# Patient Record
Sex: FEMALE | Race: BLACK OR AFRICAN AMERICAN | HISPANIC OR LATINO
[De-identification: names, ages, dates, MRNs, and addresses within clinical notes are randomized per-mention and may not be internally consistent; named-entity substitution may affect disease eponyms.]

---

## 2017-03-18 ENCOUNTER — TRANSCRIPTION ENCOUNTER (OUTPATIENT)
Age: 32
End: 2017-03-18

## 2017-05-22 ENCOUNTER — EMERGENCY (EMERGENCY)
Facility: HOSPITAL | Age: 32
LOS: 1 days | Discharge: HOME | End: 2017-05-22
Attending: EMERGENCY MEDICINE
Payer: MEDICAID

## 2017-05-22 VITALS
RESPIRATION RATE: 17 BRPM | TEMPERATURE: 99 F | SYSTOLIC BLOOD PRESSURE: 127 MMHG | OXYGEN SATURATION: 99 % | DIASTOLIC BLOOD PRESSURE: 74 MMHG | HEIGHT: 62 IN | WEIGHT: 149.91 LBS | HEART RATE: 61 BPM

## 2017-05-22 VITALS
DIASTOLIC BLOOD PRESSURE: 56 MMHG | HEART RATE: 67 BPM | RESPIRATION RATE: 17 BRPM | TEMPERATURE: 99 F | OXYGEN SATURATION: 100 % | SYSTOLIC BLOOD PRESSURE: 100 MMHG

## 2017-05-22 DIAGNOSIS — N20.0 CALCULUS OF KIDNEY: ICD-10-CM

## 2017-05-22 DIAGNOSIS — R10.9 UNSPECIFIED ABDOMINAL PAIN: ICD-10-CM

## 2017-05-22 DIAGNOSIS — Z98.82 BREAST IMPLANT STATUS: Chronic | ICD-10-CM

## 2017-05-22 LAB
ALBUMIN SERPL ELPH-MCNC: 3.7 G/DL — SIGNIFICANT CHANGE UP (ref 3.3–5)
ALP SERPL-CCNC: 64 U/L — SIGNIFICANT CHANGE UP (ref 40–120)
ALT FLD-CCNC: 19 U/L — SIGNIFICANT CHANGE UP (ref 12–78)
ANION GAP SERPL CALC-SCNC: 7 MMOL/L — SIGNIFICANT CHANGE UP (ref 5–17)
APPEARANCE UR: CLEAR — SIGNIFICANT CHANGE UP
AST SERPL-CCNC: 14 U/L — LOW (ref 15–37)
BACTERIA # UR AUTO: ABNORMAL
BASOPHILS # BLD AUTO: 0.1 K/UL — SIGNIFICANT CHANGE UP (ref 0–0.2)
BASOPHILS NFR BLD AUTO: 0.4 % — SIGNIFICANT CHANGE UP (ref 0–2)
BILIRUB SERPL-MCNC: 0.3 MG/DL — SIGNIFICANT CHANGE UP (ref 0.2–1.2)
BILIRUB UR-MCNC: NEGATIVE — SIGNIFICANT CHANGE UP
BUN SERPL-MCNC: 9 MG/DL — SIGNIFICANT CHANGE UP (ref 7–23)
CALCIUM SERPL-MCNC: 8.3 MG/DL — LOW (ref 8.5–10.1)
CHLORIDE SERPL-SCNC: 108 MMOL/L — SIGNIFICANT CHANGE UP (ref 96–108)
CO2 SERPL-SCNC: 29 MMOL/L — SIGNIFICANT CHANGE UP (ref 22–31)
COLOR SPEC: YELLOW — SIGNIFICANT CHANGE UP
CREAT SERPL-MCNC: 0.54 MG/DL — SIGNIFICANT CHANGE UP (ref 0.5–1.3)
DIFF PNL FLD: ABNORMAL
EOSINOPHIL # BLD AUTO: 0.1 K/UL — SIGNIFICANT CHANGE UP (ref 0–0.5)
EOSINOPHIL NFR BLD AUTO: 0.9 % — SIGNIFICANT CHANGE UP (ref 0–6)
EPI CELLS # UR: SIGNIFICANT CHANGE UP
GLUCOSE SERPL-MCNC: 91 MG/DL — SIGNIFICANT CHANGE UP (ref 70–99)
GLUCOSE UR QL: NEGATIVE MG/DL — SIGNIFICANT CHANGE UP
HCG SERPL-ACNC: <1 MIU/ML — SIGNIFICANT CHANGE UP
HCT VFR BLD CALC: 38.5 % — SIGNIFICANT CHANGE UP (ref 34.5–45)
HGB BLD-MCNC: 13.7 G/DL — SIGNIFICANT CHANGE UP (ref 11.5–15.5)
HYALINE CASTS # UR AUTO: ABNORMAL /LPF
KETONES UR-MCNC: NEGATIVE — SIGNIFICANT CHANGE UP
LEUKOCYTE ESTERASE UR-ACNC: ABNORMAL
LIDOCAIN IGE QN: 132 U/L — SIGNIFICANT CHANGE UP (ref 73–393)
LYMPHOCYTES # BLD AUTO: 17 % — SIGNIFICANT CHANGE UP (ref 13–44)
LYMPHOCYTES # BLD AUTO: 2.5 K/UL — SIGNIFICANT CHANGE UP (ref 1–3.3)
MCHC RBC-ENTMCNC: 32.1 PG — SIGNIFICANT CHANGE UP (ref 27–34)
MCHC RBC-ENTMCNC: 35.6 GM/DL — SIGNIFICANT CHANGE UP (ref 32–36)
MCV RBC AUTO: 90.3 FL — SIGNIFICANT CHANGE UP (ref 80–100)
MONOCYTES # BLD AUTO: 1.1 K/UL — HIGH (ref 0–0.9)
MONOCYTES NFR BLD AUTO: 7.2 % — SIGNIFICANT CHANGE UP (ref 2–14)
NEUTROPHILS # BLD AUTO: 11.1 K/UL — HIGH (ref 1.8–7.4)
NEUTROPHILS NFR BLD AUTO: 74.5 % — SIGNIFICANT CHANGE UP (ref 43–77)
NITRITE UR-MCNC: NEGATIVE — SIGNIFICANT CHANGE UP
PH UR: 7 — SIGNIFICANT CHANGE UP (ref 5–8)
PLATELET # BLD AUTO: 283 K/UL — SIGNIFICANT CHANGE UP (ref 150–400)
POTASSIUM SERPL-MCNC: 3.6 MMOL/L — SIGNIFICANT CHANGE UP (ref 3.5–5.3)
POTASSIUM SERPL-SCNC: 3.6 MMOL/L — SIGNIFICANT CHANGE UP (ref 3.5–5.3)
PROT SERPL-MCNC: 7.5 GM/DL — SIGNIFICANT CHANGE UP (ref 6–8.3)
PROT UR-MCNC: 15 MG/DL
RBC # BLD: 4.26 M/UL — SIGNIFICANT CHANGE UP (ref 3.8–5.2)
RBC # FLD: 12 % — SIGNIFICANT CHANGE UP (ref 11–15)
RBC CASTS # UR COMP ASSIST: ABNORMAL /HPF (ref 0–4)
SODIUM SERPL-SCNC: 144 MMOL/L — SIGNIFICANT CHANGE UP (ref 135–145)
SP GR SPEC: 1.01 — SIGNIFICANT CHANGE UP (ref 1.01–1.02)
UROBILINOGEN FLD QL: NEGATIVE MG/DL — SIGNIFICANT CHANGE UP
WBC # BLD: 15 K/UL — HIGH (ref 3.8–10.5)
WBC # FLD AUTO: 15 K/UL — HIGH (ref 3.8–10.5)
WBC UR QL: ABNORMAL

## 2017-05-22 PROCEDURE — 99285 EMERGENCY DEPT VISIT HI MDM: CPT | Mod: 25

## 2017-05-22 PROCEDURE — 74176 CT ABD & PELVIS W/O CONTRAST: CPT | Mod: 26

## 2017-05-22 RX ORDER — TAMSULOSIN HYDROCHLORIDE 0.4 MG/1
1 CAPSULE ORAL
Qty: 20 | Refills: 0
Start: 2017-05-22

## 2017-05-22 RX ORDER — KETOROLAC TROMETHAMINE 30 MG/ML
30 SYRINGE (ML) INJECTION ONCE
Qty: 0 | Refills: 0 | Status: DISCONTINUED | OUTPATIENT
Start: 2017-05-22 | End: 2017-05-22

## 2017-05-22 RX ORDER — ONDANSETRON 8 MG/1
4 TABLET, FILM COATED ORAL ONCE
Qty: 0 | Refills: 0 | Status: COMPLETED | OUTPATIENT
Start: 2017-05-22 | End: 2017-05-22

## 2017-05-22 RX ORDER — SODIUM CHLORIDE 9 MG/ML
1000 INJECTION INTRAMUSCULAR; INTRAVENOUS; SUBCUTANEOUS ONCE
Qty: 0 | Refills: 0 | Status: COMPLETED | OUTPATIENT
Start: 2017-05-22 | End: 2017-05-22

## 2017-05-22 RX ORDER — MORPHINE SULFATE 50 MG/1
2 CAPSULE, EXTENDED RELEASE ORAL ONCE
Qty: 0 | Refills: 0 | Status: DISCONTINUED | OUTPATIENT
Start: 2017-05-22 | End: 2017-05-22

## 2017-05-22 RX ORDER — CEPHALEXIN 500 MG
1 CAPSULE ORAL
Qty: 40 | Refills: 0
Start: 2017-05-22 | End: 2017-06-01

## 2017-05-22 RX ADMIN — SODIUM CHLORIDE 1000 MILLILITER(S): 9 INJECTION INTRAMUSCULAR; INTRAVENOUS; SUBCUTANEOUS at 05:48

## 2017-05-22 RX ADMIN — MORPHINE SULFATE 2 MILLIGRAM(S): 50 CAPSULE, EXTENDED RELEASE ORAL at 07:18

## 2017-05-22 RX ADMIN — Medication 30 MILLIGRAM(S): at 05:56

## 2017-05-22 RX ADMIN — ONDANSETRON 4 MILLIGRAM(S): 8 TABLET, FILM COATED ORAL at 05:54

## 2017-05-22 RX ADMIN — MORPHINE SULFATE 2 MILLIGRAM(S): 50 CAPSULE, EXTENDED RELEASE ORAL at 05:47

## 2017-05-22 RX ADMIN — Medication 1 TABLET(S): at 07:40

## 2017-05-22 RX ADMIN — Medication 30 MILLIGRAM(S): at 07:18

## 2017-05-22 NOTE — ED PROVIDER NOTE - OBJECTIVE STATEMENT
Pertinent PMH/PSH/FHx/SHx and Review of Systems contained within:  Patient denying any pmh, PSH of liposuction and breast augmentation presents to the ED for left flank pain radiating to groin and 1 episode of vomiting.  Reports increased urinary frequency for the last few days.  Woke up from pain suddenly at 1 am this evening, says that "it felt someone kicked me hard in the back" and felt a ball of pain in the left groin.  Felt like she had to urinate but ended up having a BM instead.  Denies any dysuria or hematuria.  Denies constipation or diarrhea.  No abnormal vaginal discharge or bleeding.      No fever/chills, No headache/photophobia/eye pain/changes in vision, No ear pain/sore throat/dysphagia, No chest pain/palpitations, no SOB/cough/wheeze/stridor, no discharge, No neck pain, no rash, no changes in neurological status/function. Pertinent PMH/PSH/FHx/SHx and Review of Systems contained within:  Patient denying any pmh other than UTI, PSH of liposuction and breast augmentation presents to the ED for left flank pain radiating to groin and 1 episode of vomiting.  Reports increased urinary frequency for the last few days.  Woke up from pain suddenly at 1 am this evening, says that "it felt someone kicked me hard in the back" and felt a ball of pain in the left groin.  Felt like she had to urinate but ended up having a BM instead.  Denies any dysuria or hematuria.  Denies constipation or diarrhea.  No abnormal vaginal discharge or bleeding.  LMP was 2-3 months ago, has IUD in place.     No fever/chills, No headache/photophobia/eye pain/changes in vision, No ear pain/sore throat/dysphagia, No chest pain/palpitations, no SOB/cough/wheeze/stridor, no discharge, No neck pain, no rash, no changes in neurological status/function.

## 2017-05-22 NOTE — ED ADULT TRIAGE NOTE - CHIEF COMPLAINT QUOTE
biba:   c/o abdominal pain, states it has been throughout the day, left lower quad which radiates to back.  sensation to urinate but has not.  vomit x1.

## 2017-05-22 NOTE — ED PROVIDER NOTE - MEDICAL DECISION MAKING DETAILS
Patient with left flank pain radiating to groin.  VSS.  Labs with mild leukocytosis.  Pending UA and work up of renal stone.  If CT neg would still treat for pyelo.  First dose bactrim given.  Patient signed out to incoming physician.  All decisions regarding the progression of care will be made at their discretion.  Patient stable on sign out with well controlled pain. Patient with left flank pain radiating to groin.  VSS.  Labs with mild leukocytosis.  Pending UA and work up of renal stone.  If CT neg would still treat for pyelo.  First dose bactrim given.  Patient signed out to incoming physician.  All decisions regarding the progression of care will be made at their discretion.  Patient stable on sign out with well controlled pain.  genie vazquez. pt was a signout pending ct. ct shows renal stone 4mm. pt pain controlled. given abx. pt appears well and non-toxic. . VSS. Sx have improved during ED stay. No acute events during ED observation period. Precautions given to return to the ED if sx persist or change. Pt expresses understanding and has no further questions. Pt feels comfortable and wishes to be discharged home. Instructed to follow up with PMD in 24 hrs. urology

## 2017-05-22 NOTE — ED ADULT NURSE NOTE - OBJECTIVE STATEMENT
Patient received lying on stretcher writhing in pain, she presents to ED w/ c/o colicky abd pain a/w n/v and L flank pain since yesterday. Reports one episode of consumed food, reports urinary urgency, denies hematuria, frequency or vaginal d/c. LMP - unable to recall, reports IUD insertion 3/2016. Patient noted guarding abd, abd soft, non-distended, +LLQ and suprapubic tenderness, no active vomiting, appears uncomfortable, +CVAT. 20G angiocath inserted to R arm, labs drawn and sent, urine collected, started on 1L MD ELVIA evaluation pending, will continue to monitor for safety.

## 2017-05-23 LAB
CULTURE RESULTS: NO GROWTH — SIGNIFICANT CHANGE UP
SPECIMEN SOURCE: SIGNIFICANT CHANGE UP

## 2017-06-21 ENCOUNTER — FORM ENCOUNTER (OUTPATIENT)
Age: 32
End: 2017-06-21

## 2017-06-22 ENCOUNTER — OUTPATIENT (OUTPATIENT)
Dept: OUTPATIENT SERVICES | Facility: HOSPITAL | Age: 32
LOS: 1 days | End: 2017-06-22
Payer: MEDICAID

## 2017-06-22 ENCOUNTER — APPOINTMENT (OUTPATIENT)
Dept: ULTRASOUND IMAGING | Facility: CLINIC | Age: 32
End: 2017-06-22

## 2017-06-22 DIAGNOSIS — Z00.8 ENCOUNTER FOR OTHER GENERAL EXAMINATION: ICD-10-CM

## 2017-06-22 DIAGNOSIS — Z98.82 BREAST IMPLANT STATUS: Chronic | ICD-10-CM

## 2017-06-22 PROCEDURE — 76775 US EXAM ABDO BACK WALL LIM: CPT

## 2017-07-25 ENCOUNTER — APPOINTMENT (OUTPATIENT)
Dept: UROLOGY | Facility: AMBULATORY SURGERY CENTER | Age: 32
End: 2017-07-25

## 2019-05-07 ENCOUNTER — RESULT REVIEW (OUTPATIENT)
Age: 34
End: 2019-05-07

## 2019-05-08 ENCOUNTER — APPOINTMENT (OUTPATIENT)
Dept: SURGICAL ONCOLOGY | Facility: CLINIC | Age: 34
End: 2019-05-08

## 2020-10-12 ENCOUNTER — RESULT REVIEW (OUTPATIENT)
Age: 35
End: 2020-10-12

## 2020-10-14 ENCOUNTER — INPATIENT (INPATIENT)
Facility: HOSPITAL | Age: 35
LOS: 3 days | Discharge: HOME | End: 2020-10-18
Attending: INTERNAL MEDICINE | Admitting: INTERNAL MEDICINE
Payer: COMMERCIAL

## 2020-10-14 VITALS
WEIGHT: 149.91 LBS | DIASTOLIC BLOOD PRESSURE: 105 MMHG | OXYGEN SATURATION: 95 % | HEIGHT: 62 IN | SYSTOLIC BLOOD PRESSURE: 156 MMHG | RESPIRATION RATE: 24 BRPM | TEMPERATURE: 100 F | HEART RATE: 120 BPM

## 2020-10-14 DIAGNOSIS — Z98.82 BREAST IMPLANT STATUS: Chronic | ICD-10-CM

## 2020-10-14 DIAGNOSIS — U07.1 COVID-19: ICD-10-CM

## 2020-10-14 DIAGNOSIS — Z29.9 ENCOUNTER FOR PROPHYLACTIC MEASURES, UNSPECIFIED: ICD-10-CM

## 2020-10-14 DIAGNOSIS — J45.909 UNSPECIFIED ASTHMA, UNCOMPLICATED: ICD-10-CM

## 2020-10-14 LAB
ALBUMIN SERPL ELPH-MCNC: 3.9 G/DL — SIGNIFICANT CHANGE UP (ref 3.3–5)
ALP SERPL-CCNC: 69 U/L — SIGNIFICANT CHANGE UP (ref 40–120)
ALT FLD-CCNC: 15 U/L — SIGNIFICANT CHANGE UP (ref 12–78)
ANION GAP SERPL CALC-SCNC: 8 MMOL/L — SIGNIFICANT CHANGE UP (ref 5–17)
APPEARANCE UR: CLEAR — SIGNIFICANT CHANGE UP
APTT BLD: 27.3 SEC — LOW (ref 27.5–35.5)
AST SERPL-CCNC: 13 U/L — LOW (ref 15–37)
BACTERIA # UR AUTO: ABNORMAL
BASOPHILS # BLD AUTO: 0.01 K/UL — SIGNIFICANT CHANGE UP (ref 0–0.2)
BASOPHILS NFR BLD AUTO: 0.1 % — SIGNIFICANT CHANGE UP (ref 0–2)
BILIRUB SERPL-MCNC: 0.2 MG/DL — SIGNIFICANT CHANGE UP (ref 0.2–1.2)
BILIRUB UR-MCNC: NEGATIVE — SIGNIFICANT CHANGE UP
BUN SERPL-MCNC: 9 MG/DL — SIGNIFICANT CHANGE UP (ref 7–23)
CALCIUM SERPL-MCNC: 8.9 MG/DL — SIGNIFICANT CHANGE UP (ref 8.5–10.1)
CHLORIDE SERPL-SCNC: 106 MMOL/L — SIGNIFICANT CHANGE UP (ref 96–108)
CO2 SERPL-SCNC: 26 MMOL/L — SIGNIFICANT CHANGE UP (ref 22–31)
COLOR SPEC: YELLOW — SIGNIFICANT CHANGE UP
CREAT SERPL-MCNC: 0.71 MG/DL — SIGNIFICANT CHANGE UP (ref 0.5–1.3)
D DIMER BLD IA.RAPID-MCNC: <150 NG/ML DDU — SIGNIFICANT CHANGE UP
DIFF PNL FLD: ABNORMAL
EOSINOPHIL # BLD AUTO: 0 K/UL — SIGNIFICANT CHANGE UP (ref 0–0.5)
EOSINOPHIL NFR BLD AUTO: 0 % — SIGNIFICANT CHANGE UP (ref 0–6)
EPI CELLS # UR: SIGNIFICANT CHANGE UP
FIBRINOGEN PPP-MCNC: 605 MG/DL — HIGH (ref 290–520)
GLUCOSE SERPL-MCNC: 147 MG/DL — HIGH (ref 70–99)
GLUCOSE UR QL: NEGATIVE MG/DL — SIGNIFICANT CHANGE UP
HCG SERPL-ACNC: <1 MIU/ML — SIGNIFICANT CHANGE UP
HCT VFR BLD CALC: 41.9 % — SIGNIFICANT CHANGE UP (ref 34.5–45)
HGB BLD-MCNC: 14.2 G/DL — SIGNIFICANT CHANGE UP (ref 11.5–15.5)
IMM GRANULOCYTES NFR BLD AUTO: 0.3 % — SIGNIFICANT CHANGE UP (ref 0–1.5)
INR BLD: 1.11 RATIO — SIGNIFICANT CHANGE UP (ref 0.88–1.16)
KETONES UR-MCNC: NEGATIVE — SIGNIFICANT CHANGE UP
LACTATE SERPL-SCNC: 3.5 MMOL/L — HIGH (ref 0.7–2)
LEUKOCYTE ESTERASE UR-ACNC: NEGATIVE — SIGNIFICANT CHANGE UP
LYMPHOCYTES # BLD AUTO: 0.53 K/UL — LOW (ref 1–3.3)
LYMPHOCYTES # BLD AUTO: 7 % — LOW (ref 13–44)
MCHC RBC-ENTMCNC: 30 PG — SIGNIFICANT CHANGE UP (ref 27–34)
MCHC RBC-ENTMCNC: 33.9 GM/DL — SIGNIFICANT CHANGE UP (ref 32–36)
MCV RBC AUTO: 88.4 FL — SIGNIFICANT CHANGE UP (ref 80–100)
MONOCYTES # BLD AUTO: 0.09 K/UL — SIGNIFICANT CHANGE UP (ref 0–0.9)
MONOCYTES NFR BLD AUTO: 1.2 % — LOW (ref 2–14)
NEUTROPHILS # BLD AUTO: 6.93 K/UL — SIGNIFICANT CHANGE UP (ref 1.8–7.4)
NEUTROPHILS NFR BLD AUTO: 91.4 % — HIGH (ref 43–77)
NITRITE UR-MCNC: NEGATIVE — SIGNIFICANT CHANGE UP
NRBC # BLD: 0 /100 WBCS — SIGNIFICANT CHANGE UP (ref 0–0)
PH UR: 6 — SIGNIFICANT CHANGE UP (ref 5–8)
PLATELET # BLD AUTO: 328 K/UL — SIGNIFICANT CHANGE UP (ref 150–400)
POTASSIUM SERPL-MCNC: 3.3 MMOL/L — LOW (ref 3.5–5.3)
POTASSIUM SERPL-SCNC: 3.3 MMOL/L — LOW (ref 3.5–5.3)
PROT SERPL-MCNC: 8.8 GM/DL — HIGH (ref 6–8.3)
PROT UR-MCNC: NEGATIVE MG/DL — SIGNIFICANT CHANGE UP
PROTHROM AB SERPL-ACNC: 12.8 SEC — SIGNIFICANT CHANGE UP (ref 10.6–13.6)
RAPID RVP RESULT: SIGNIFICANT CHANGE UP
RBC # BLD: 4.74 M/UL — SIGNIFICANT CHANGE UP (ref 3.8–5.2)
RBC # FLD: 12.1 % — SIGNIFICANT CHANGE UP (ref 10.3–14.5)
RBC CASTS # UR COMP ASSIST: SIGNIFICANT CHANGE UP /HPF (ref 0–4)
SARS-COV-2 RNA SPEC QL NAA+PROBE: SIGNIFICANT CHANGE UP
SODIUM SERPL-SCNC: 140 MMOL/L — SIGNIFICANT CHANGE UP (ref 135–145)
SP GR SPEC: 1.01 — SIGNIFICANT CHANGE UP (ref 1.01–1.02)
TROPONIN I SERPL-MCNC: <.015 NG/ML — SIGNIFICANT CHANGE UP (ref 0.01–0.04)
UROBILINOGEN FLD QL: NEGATIVE MG/DL — SIGNIFICANT CHANGE UP
WBC # BLD: 7.58 K/UL — SIGNIFICANT CHANGE UP (ref 3.8–10.5)
WBC # FLD AUTO: 7.58 K/UL — SIGNIFICANT CHANGE UP (ref 3.8–10.5)

## 2020-10-14 PROCEDURE — 71045 X-RAY EXAM CHEST 1 VIEW: CPT | Mod: 26

## 2020-10-14 PROCEDURE — 93010 ELECTROCARDIOGRAM REPORT: CPT

## 2020-10-14 PROCEDURE — 99222 1ST HOSP IP/OBS MODERATE 55: CPT

## 2020-10-14 PROCEDURE — 99285 EMERGENCY DEPT VISIT HI MDM: CPT

## 2020-10-14 RX ORDER — TACROLIMUS/VEHICLE BASE NO.238 0.1 %
1 CREAM (GRAM) TOPICAL
Qty: 0 | Refills: 0 | DISCHARGE

## 2020-10-14 RX ORDER — ALBUTEROL 90 UG/1
4 AEROSOL, METERED ORAL ONCE
Refills: 0 | Status: COMPLETED | OUTPATIENT
Start: 2020-10-14 | End: 2020-10-14

## 2020-10-14 RX ORDER — DEXAMETHASONE 0.5 MG/5ML
6 ELIXIR ORAL DAILY
Refills: 0 | Status: DISCONTINUED | OUTPATIENT
Start: 2020-10-14 | End: 2020-10-18

## 2020-10-14 RX ORDER — SODIUM CHLORIDE 9 MG/ML
1500 INJECTION INTRAMUSCULAR; INTRAVENOUS; SUBCUTANEOUS ONCE
Refills: 0 | Status: COMPLETED | OUTPATIENT
Start: 2020-10-14 | End: 2020-10-14

## 2020-10-14 RX ORDER — IBUPROFEN 200 MG
1 TABLET ORAL
Qty: 0 | Refills: 0 | DISCHARGE

## 2020-10-14 RX ORDER — AMOXICILLIN 250 MG/5ML
1 SUSPENSION, RECONSTITUTED, ORAL (ML) ORAL
Qty: 0 | Refills: 0 | DISCHARGE

## 2020-10-14 RX ORDER — KETOCONAZOLE 20 MG/G
1 AEROSOL, FOAM TOPICAL
Qty: 0 | Refills: 0 | DISCHARGE

## 2020-10-14 RX ORDER — DEXAMETHASONE 0.5 MG/5ML
10 ELIXIR ORAL ONCE
Refills: 0 | Status: DISCONTINUED | OUTPATIENT
Start: 2020-10-14 | End: 2020-10-14

## 2020-10-14 RX ORDER — ACETAMINOPHEN 500 MG
325 TABLET ORAL EVERY 4 HOURS
Refills: 0 | Status: DISCONTINUED | OUTPATIENT
Start: 2020-10-14 | End: 2020-10-18

## 2020-10-14 RX ORDER — ALBUTEROL 90 UG/1
2 AEROSOL, METERED ORAL EVERY 4 HOURS
Refills: 0 | Status: DISCONTINUED | OUTPATIENT
Start: 2020-10-14 | End: 2020-10-18

## 2020-10-14 RX ORDER — ENOXAPARIN SODIUM 100 MG/ML
40 INJECTION SUBCUTANEOUS DAILY
Refills: 0 | Status: DISCONTINUED | OUTPATIENT
Start: 2020-10-14 | End: 2020-10-18

## 2020-10-14 RX ORDER — DEXAMETHASONE 0.5 MG/5ML
10 ELIXIR ORAL ONCE
Refills: 0 | Status: COMPLETED | OUTPATIENT
Start: 2020-10-14 | End: 2020-10-14

## 2020-10-14 RX ORDER — SODIUM CHLORIDE 9 MG/ML
500 INJECTION, SOLUTION INTRAVENOUS
Refills: 0 | Status: DISCONTINUED | OUTPATIENT
Start: 2020-10-14 | End: 2020-10-18

## 2020-10-14 RX ORDER — POTASSIUM CHLORIDE 20 MEQ
20 PACKET (EA) ORAL ONCE
Refills: 0 | Status: COMPLETED | OUTPATIENT
Start: 2020-10-14 | End: 2020-10-14

## 2020-10-14 RX ORDER — ONDANSETRON 8 MG/1
4 TABLET, FILM COATED ORAL EVERY 6 HOURS
Refills: 0 | Status: DISCONTINUED | OUTPATIENT
Start: 2020-10-14 | End: 2020-10-18

## 2020-10-14 RX ORDER — ACETAMINOPHEN 500 MG
325 TABLET ORAL ONCE
Refills: 0 | Status: COMPLETED | OUTPATIENT
Start: 2020-10-14 | End: 2020-10-14

## 2020-10-14 RX ADMIN — Medication 325 MILLIGRAM(S): at 16:13

## 2020-10-14 RX ADMIN — Medication 10 MILLIGRAM(S): at 16:56

## 2020-10-14 RX ADMIN — SODIUM CHLORIDE 500 MILLILITER(S): 9 INJECTION, SOLUTION INTRAVENOUS at 16:55

## 2020-10-14 RX ADMIN — ALBUTEROL 4 PUFF(S): 90 AEROSOL, METERED ORAL at 16:19

## 2020-10-14 RX ADMIN — SODIUM CHLORIDE 500 MILLILITER(S): 9 INJECTION, SOLUTION INTRAVENOUS at 22:10

## 2020-10-14 RX ADMIN — ENOXAPARIN SODIUM 40 MILLIGRAM(S): 100 INJECTION SUBCUTANEOUS at 20:15

## 2020-10-14 RX ADMIN — SODIUM CHLORIDE 500 MILLILITER(S): 9 INJECTION INTRAMUSCULAR; INTRAVENOUS; SUBCUTANEOUS at 20:14

## 2020-10-14 RX ADMIN — Medication 20 MILLIEQUIVALENT(S): at 20:14

## 2020-10-14 RX ADMIN — Medication 200 MILLIGRAM(S): at 22:17

## 2020-10-14 RX ADMIN — Medication 102 MILLIGRAM(S): at 16:53

## 2020-10-14 RX ADMIN — Medication 100 MILLIGRAM(S): at 16:13

## 2020-10-14 NOTE — ED PROVIDER NOTE - PHYSICAL EXAMINATION
Gen: Alert, + mild tachypnea, + chronic dry cough  Head: NC, AT, PERRL, normal lids/conjunctiva   ENT: patent oropharynx without erythema/exudate, uvula midline  Neck: supple, no tenderness/meningismus  Pulm: Bilateral clear BS, normal resp effort  CV: + tachy, no M/R/G, +dist pulses   Abd: soft, NT/ND, +BS, no guarding/rebound tenderness  Mskel: no edema/erythema/cyanosis   Skin: no rash, no bruising  Neuro: AAOx3, no sensory/motor deficits, CN 2-12 intact

## 2020-10-14 NOTE — ED PROVIDER NOTE - OBJECTIVE STATEMENT
34yo female with pmh asthma (no intubation), presents with fever, cough, sob, lack of appetite/smell x 1 week. Pt's mom () + asymptomatic covid 2 weeks ago, pt had negative test 11 days ago, but started getting symptomatic 7 days ago. Pt with inc sob and cough since yesterday, taken 15mg orapred yesterday and today. Pt with temp and continuous coughing with some sob today. denies cp, dizziness, headache, NV, abd pain, dysuria. Pt grand mom also + covid, just left hospital yesterday.     + fever/chills, No photophobia/eye pain/changes in vision, No ear pain/sore throat/dysphagia, No chest pain/palpitations, + SOB/cough, no wheeze/stridor, No abdominal pain, No N/V/D, no dysuria/frequency/discharge, No neck/back pain, no rash, no changes in neurological status/function.

## 2020-10-14 NOTE — H&P ADULT - PROBLEM SELECTOR PLAN 1
Tested negative but likely COVID infection due to recent family hx of positive cases  f/u D-dimer, ESR, CRP, lactate dehydrogenase, IL 6, ferritin, fibrinogen  Continue decadron.  Will hold off on anti immunologics as patient comfortable on room air.

## 2020-10-14 NOTE — ED ADULT TRIAGE NOTE - CHIEF COMPLAINT QUOTE
Pt c/o dry cough, and fever last 2 days , both mom and grand mom are positive for covid , pt was covid negative 1 week ago, hx asthma, 103F fever at 130pm took 650mh tylenol at 2pm.

## 2020-10-14 NOTE — H&P ADULT - HISTORY OF PRESENT ILLNESS
Pt is a 36yo F with PMH of asthma, psoriasis, kidney stone present to ED with fever, cough, SOB lack of smell and appetite x 5 days. Pt states her mom tested positive for Covid on 10/4th, her grandma was tested pos 10/8th and admitted to [facility] for covid pna. Pt tested neg for covid on 10/5, however start having symptom about 5 days ago. Pt states her cough and SOB has worsening since yesterday. Pt had Prednisolone 15mg from before and took it last night and today. Pt states cough persistent with worsening SOB and cough associated with fatigue and chest pain when cough. Tmax today was 103. Pt denies of any sore throat, congestion, palpitation, dizziness. Pt had telemedicine with PCP and was instructed come to the hospital    At ED  Covid PCR neg   Viral RVP neg  Lactate 3.5   Fibinogen 605  Didimer <150  K 3.3   s/p Decadron 10mg   s/p 500cc LR bolus

## 2020-10-14 NOTE — ED ADULT NURSE NOTE - NSIMPLEMENTINTERV_GEN_ALL_ED
Implemented All Fall with Harm Risk Interventions:  [place] to call system. Call bell, personal items and telephone within reach. Instruct patient to call for assistance. Room bathroom lighting operational. Non-slip footwear when patient is off stretcher. Physically safe environment: no spills, clutter or unnecessary equipment. Stretcher in lowest position, wheels locked, appropriate side rails in place. Provide visual cue, wrist band, yellow gown, etc. Monitor gait and stability. Monitor for mental status changes and reorient to person, place, and time. Review medications for side effects contributing to fall risk. Reinforce activity limits and safety measures with patient and family. Provide visual clues: red socks.

## 2020-10-14 NOTE — H&P ADULT - ASSESSMENT
*sepsis secondary to Covid 19   Admit to medicine   s/p Decadron 10mg at ED   Isolation precaution   Cont trend inflammatory markers  f/u D-dimer, ESR, CRP, lactate dehydrogenase, IL 6, ferritin, fibrinogen   ID consult   Supplemental oxygen as needed   Tylenol as needed for fever   Albuterol as needed   Benzonatate as needed for cough       Code status   Full code Pt is a 36yo F with PMH of asthma, psoriasis, kidney stone present to ED with fever, cough, SOB lack of smell and appetite x 5 days. Pt states her mom tested positive for Covid on 10/4th, her grandma was tested pos 10/8th and admitted to [facility] for covid pna. Pt tested neg for covid on 10/5, however start having symptom about 5 days ago. Pt states her cough and SOB has worsening since yesterday. Pt had Prednisolone 15mg from before and took it last night and today. Pt states cough persistent with worsening SOB and cough associated with fatigue and chest pain when cough. Tmax today was 103. Pt denies of any sore throat, congestion, palpitation, dizziness. Pt had telemedicine with PCP and was instructed come to the hospital    *sepsis secondary to Covid 19   Admit to medicine   s/p Decadron 10mg at ED   Isolation precaution   RVP panel negative  Covid PCR negative, however very high suspicion given close contact with pos Covid at home  Cont trend inflammatory markers  f/u D-dimer, ESR, CRP, lactate dehydrogenase, IL 6, ferritin, fibrinogen   ID consult   Supplemental oxygen as needed   Tylenol as needed for fever   Albuterol as needed   Benzonatate as needed for cough     *Hypokalemia  s/p 20meq KCL x 1  follow up bmp in the am     *DVT ppx   Lovenox Pt is a 36yo F with PMH of asthma, psoriasis, kidney stone present to ED with fever, cough, SOB lack of smell and appetite x 5 days. Pt states her mom tested positive for Covid on 10/4th, her grandma was tested pos 10/8th and admitted to [facility] for covid pna. Pt tested neg for covid on 10/5, however start having symptom about 5 days ago. Pt states her cough and SOB has worsening since yesterday. Pt had Prednisolone 15mg from before and took it last night and today. Pt states cough persistent with worsening SOB and cough associated with fatigue and chest pain when cough. Tmax today was 103. Pt denies of any sore throat, congestion, palpitation, dizziness. Pt had telemedicine with PCP and was instructed come to the hospital    *sepsis secondary to Covid 19   Admit to medicine   s/p Decadron 10mg at ED   Isolation precaution   RVP panel negative  Covid PCR negative, however very high suspicion given close contact with pos Covid at home  Cont trend inflammatory markers  f/u D-dimer, ESR, CRP, lactate dehydrogenase, IL 6, ferritin, fibrinogen   Supplemental oxygen as needed   Tylenol as needed for fever   Albuterol as needed   Benzonatate as needed for cough     *Hypokalemia  s/p 20meq KCL x 1  follow up bmp in the am     *DVT ppx   Lovenox

## 2020-10-14 NOTE — ED PROVIDER NOTE - CLINICAL SUMMARY MEDICAL DECISION MAKING FREE TEXT BOX
Pt still with persistent coughing, tachypnea and subjectively sob. Pt with very likely COVID and XRAY with infiltrates. d/w dr cox for admission.

## 2020-10-14 NOTE — H&P ADULT - NSHPPHYSICALEXAM_GEN_ALL_CORE
Pt appears comfortable in bed. Answer questions appropriately     Vital Signs Last 24 Hrs  T(C): 37.7 (14 Oct 2020 15:17), Max: 37.7 (14 Oct 2020 15:17)  T(F): 99.8 (14 Oct 2020 15:17), Max: 99.8 (14 Oct 2020 15:17)  HR: 106 (14 Oct 2020 17:26) (106 - 120)  BP: 156/105 (14 Oct 2020 15:17) (156/105 - 156/105)  BP(mean): --  RR: 24 (14 Oct 2020 17:26) (24 - 24)  SpO2: 96% (14 Oct 2020 17:26) (95% - 96%) Physical exam:  General: patient in no acute distress, resting comfortably  Head:  Atraumatic, Normocephalic  Eyes: EOMI, PERRLA, clear sclera  Neck: Supple, thyroid nontender, non enlarged  Cardio: S1/S2 +ve, regular rate and rhythm, no M/G/R  Resp: mild rales and faint wheezes  GI: abdomen soft, nontender, non distended, no guarding, BS +ve x 4  Ext: no significant pedal edema  Neuro: CN 2-12 intact, no significant motor or sensory deficits.  Skin: No rashes or lesions

## 2020-10-14 NOTE — ED ADULT NURSE REASSESSMENT NOTE - NS ED NURSE REASSESS COMMENT FT1
Assumed care of pt at 1900 in assigned area. Pt attached to cardiac monitor with unproductive, dry cough. No complaints. Pending admission to ED holding. Assessment ongoing.

## 2020-10-14 NOTE — H&P ADULT - NSHPREVIEWOFSYSTEMS_GEN_ALL_CORE
Constitutional: fever, chills positive.    Ears: no hearing changes or ear pain,   Nose: no nasal congestion, sinus pain, or rhinorrhea  Cardio: no chest pain, orthopnea, edema, or palpitations  Resp: dyspnea, cough, wheezing positive.     GI: no nausea, vomiting, diarrhea, constipation, hematochezia, or melena  : no dysuria, urinary frequency, hematuria  MSK: no back pain, neck pain  Skin: no rash, pruritis   Neuro: no weakness, dizziness, lightheadedness, syncope   Heme/Lymph: no bruising or bleeding

## 2020-10-14 NOTE — H&P ADULT - NSHPLABSRESULTS_GEN_ALL_CORE
Recent Vitals  T(C): 36.8 (10-14-20 @ 19:49), Max: 37.7 (10-14-20 @ 15:17)  HR: 110 (10-14-20 @ 19:49) (106 - 120)  BP: 123/81 (10-14-20 @ 19:49) (123/81 - 156/105)  RR: 22 (10-14-20 @ 19:49) (22 - 24)  SpO2: 98% (10-14-20 @ 19:49) (95% - 98%)                        14.2   7.58  )-----------( 328      ( 14 Oct 2020 16:18 )             41.9     10-    140  |  106  |  9   ----------------------------<  147<H>  3.3<L>   |  26  |  0.71    Ca    8.9      14 Oct 2020 16:18    TPro  8.8<H>  /  Alb  3.9  /  TBili  0.2  /  DBili  x   /  AST  13<L>  /  ALT  15  /  AlkPhos  69  10-14    PT/INR - ( 14 Oct 2020 16:18 )   PT: 12.8 sec;   INR: 1.11 ratio         PTT - ( 14 Oct 2020 16:18 )  PTT:27.3 sec  LIVER FUNCTIONS - ( 14 Oct 2020 16:18 )  Alb: 3.9 g/dL / Pro: 8.8 gm/dL / ALK PHOS: 69 U/L / ALT: 15 U/L / AST: 13 U/L / GGT: x           Urinalysis Basic - ( 14 Oct 2020 16:47 )    Color: Yellow / Appearance: Clear / S.010 / pH: x  Gluc: x / Ketone: Negative  / Bili: Negative / Urobili: Negative mg/dL   Blood: x / Protein: Negative mg/dL / Nitrite: Negative   Leuk Esterase: Negative / RBC: 0-2 /HPF / WBC x   Sq Epi: x / Non Sq Epi: Occasional / Bacteria: Few        Home Medications:  Albuterol (Eqv-Proventil HFA) 90 mcg/inh inhalation aerosol: 2 puff(s) inhaled every 6 hours, As Needed (14 Oct 2020 18:33)  prednisoLONE (as acetate) 15 mg/5 mL oral suspension: 15 milligram(s) orally once a day (14 Oct 2020 19:40)

## 2020-10-15 LAB
ALBUMIN SERPL ELPH-MCNC: 3.4 G/DL — SIGNIFICANT CHANGE UP (ref 3.3–5)
ALP SERPL-CCNC: 62 U/L — SIGNIFICANT CHANGE UP (ref 40–120)
ALT FLD-CCNC: 15 U/L — SIGNIFICANT CHANGE UP (ref 12–78)
ANION GAP SERPL CALC-SCNC: 5 MMOL/L — SIGNIFICANT CHANGE UP (ref 5–17)
AST SERPL-CCNC: 13 U/L — LOW (ref 15–37)
BASOPHILS # BLD AUTO: 0.02 K/UL — SIGNIFICANT CHANGE UP (ref 0–0.2)
BASOPHILS NFR BLD AUTO: 0.2 % — SIGNIFICANT CHANGE UP (ref 0–2)
BILIRUB SERPL-MCNC: 0.2 MG/DL — SIGNIFICANT CHANGE UP (ref 0.2–1.2)
BUN SERPL-MCNC: 8 MG/DL — SIGNIFICANT CHANGE UP (ref 7–23)
CALCIUM SERPL-MCNC: 8.5 MG/DL — SIGNIFICANT CHANGE UP (ref 8.5–10.1)
CHLORIDE SERPL-SCNC: 111 MMOL/L — HIGH (ref 96–108)
CO2 SERPL-SCNC: 28 MMOL/L — SIGNIFICANT CHANGE UP (ref 22–31)
CREAT SERPL-MCNC: 0.54 MG/DL — SIGNIFICANT CHANGE UP (ref 0.5–1.3)
CRP SERPL-MCNC: 0.33 MG/DL — SIGNIFICANT CHANGE UP (ref 0–0.4)
CRP SERPL-MCNC: 0.55 MG/DL — HIGH (ref 0–0.4)
CULTURE RESULTS: SIGNIFICANT CHANGE UP
D DIMER BLD IA.RAPID-MCNC: 153 NG/ML DDU — SIGNIFICANT CHANGE UP
EOSINOPHIL # BLD AUTO: 0 K/UL — SIGNIFICANT CHANGE UP (ref 0–0.5)
EOSINOPHIL NFR BLD AUTO: 0 % — SIGNIFICANT CHANGE UP (ref 0–6)
ERYTHROCYTE [SEDIMENTATION RATE] IN BLOOD: 12 MM/HR — SIGNIFICANT CHANGE UP (ref 0–15)
FERRITIN SERPL-MCNC: 148 NG/ML — SIGNIFICANT CHANGE UP (ref 15–150)
FERRITIN SERPL-MCNC: 154 NG/ML — HIGH (ref 15–150)
FIBRINOGEN PPP-MCNC: 481 MG/DL — SIGNIFICANT CHANGE UP (ref 290–520)
GLUCOSE SERPL-MCNC: 111 MG/DL — HIGH (ref 70–99)
HCT VFR BLD CALC: 37.9 % — SIGNIFICANT CHANGE UP (ref 34.5–45)
HGB BLD-MCNC: 12.8 G/DL — SIGNIFICANT CHANGE UP (ref 11.5–15.5)
IMM GRANULOCYTES NFR BLD AUTO: 0.5 % — SIGNIFICANT CHANGE UP (ref 0–1.5)
LACTATE SERPL-SCNC: 1.4 MMOL/L — SIGNIFICANT CHANGE UP (ref 0.7–2)
LDH SERPL L TO P-CCNC: 147 U/L — SIGNIFICANT CHANGE UP (ref 50–242)
LDH SERPL L TO P-CCNC: 148 U/L — SIGNIFICANT CHANGE UP (ref 50–242)
LDH SERPL L TO P-CCNC: 189 U/L — SIGNIFICANT CHANGE UP (ref 50–242)
LYMPHOCYTES # BLD AUTO: 1.54 K/UL — SIGNIFICANT CHANGE UP (ref 1–3.3)
LYMPHOCYTES # BLD AUTO: 15.1 % — SIGNIFICANT CHANGE UP (ref 13–44)
MCHC RBC-ENTMCNC: 30.7 PG — SIGNIFICANT CHANGE UP (ref 27–34)
MCHC RBC-ENTMCNC: 33.8 GM/DL — SIGNIFICANT CHANGE UP (ref 32–36)
MCV RBC AUTO: 90.9 FL — SIGNIFICANT CHANGE UP (ref 80–100)
MONOCYTES # BLD AUTO: 0.65 K/UL — SIGNIFICANT CHANGE UP (ref 0–0.9)
MONOCYTES NFR BLD AUTO: 6.4 % — SIGNIFICANT CHANGE UP (ref 2–14)
NEUTROPHILS # BLD AUTO: 7.92 K/UL — HIGH (ref 1.8–7.4)
NEUTROPHILS NFR BLD AUTO: 77.8 % — HIGH (ref 43–77)
NRBC # BLD: 0 /100 WBCS — SIGNIFICANT CHANGE UP (ref 0–0)
PLATELET # BLD AUTO: 309 K/UL — SIGNIFICANT CHANGE UP (ref 150–400)
POTASSIUM SERPL-MCNC: 4.1 MMOL/L — SIGNIFICANT CHANGE UP (ref 3.5–5.3)
POTASSIUM SERPL-SCNC: 4.1 MMOL/L — SIGNIFICANT CHANGE UP (ref 3.5–5.3)
PROCALCITONIN SERPL-MCNC: 0.03 NG/ML — SIGNIFICANT CHANGE UP (ref 0.02–0.1)
PROT SERPL-MCNC: 7.5 GM/DL — SIGNIFICANT CHANGE UP (ref 6–8.3)
RBC # BLD: 4.17 M/UL — SIGNIFICANT CHANGE UP (ref 3.8–5.2)
RBC # FLD: 12.4 % — SIGNIFICANT CHANGE UP (ref 10.3–14.5)
SARS-COV-2 IGG SERPL QL IA: NEGATIVE — SIGNIFICANT CHANGE UP
SARS-COV-2 IGM SERPL IA-ACNC: 0.2 RATIO — SIGNIFICANT CHANGE UP
SODIUM SERPL-SCNC: 144 MMOL/L — SIGNIFICANT CHANGE UP (ref 135–145)
SPECIMEN SOURCE: SIGNIFICANT CHANGE UP
WBC # BLD: 10.18 K/UL — SIGNIFICANT CHANGE UP (ref 3.8–10.5)
WBC # FLD AUTO: 10.18 K/UL — SIGNIFICANT CHANGE UP (ref 3.8–10.5)

## 2020-10-15 PROCEDURE — 99223 1ST HOSP IP/OBS HIGH 75: CPT

## 2020-10-15 PROCEDURE — 99233 SBSQ HOSP IP/OBS HIGH 50: CPT

## 2020-10-15 RX ORDER — INFLUENZA VIRUS VACCINE 15; 15; 15; 15 UG/.5ML; UG/.5ML; UG/.5ML; UG/.5ML
0.5 SUSPENSION INTRAMUSCULAR ONCE
Refills: 0 | Status: DISCONTINUED | OUTPATIENT
Start: 2020-10-15 | End: 2020-10-18

## 2020-10-15 RX ADMIN — Medication 200 MILLIGRAM(S): at 21:12

## 2020-10-15 RX ADMIN — ENOXAPARIN SODIUM 40 MILLIGRAM(S): 100 INJECTION SUBCUTANEOUS at 11:55

## 2020-10-15 RX ADMIN — Medication 6 MILLIGRAM(S): at 06:08

## 2020-10-15 NOTE — PROGRESS NOTE ADULT - SUBJECTIVE AND OBJECTIVE BOX
Patient is a 35y old  Female who presents with a chief complaint of Covid 19 (14 Oct 2020 18:03)      OVERNIGHT EVENTS:  cough    MEDICATIONS  (STANDING):  ALBUTerol    90 MICROgram(s) HFA Inhaler 2 Puff(s) Inhalation every 4 hours  dexAMETHasone  Injectable 6 milliGRAM(s) IV Push daily  enoxaparin Injectable 40 milliGRAM(s) SubCutaneous daily  influenza   Vaccine 0.5 milliLiter(s) IntraMuscular once  lactated ringers. 500 milliLiter(s) (500 mL/Hr) IV Continuous <Continuous>    MEDICATIONS  (PRN):  acetaminophen   Tablet .. 325 milliGRAM(s) Oral every 4 hours PRN Temp greater or equal to 38C (100.4F), Mild Pain (1 - 3)  benzonatate 200 milliGRAM(s) Oral three times a day PRN Cough  hydrocodone/homatropine Syrup 5 milliLiter(s) Oral every 6 hours PRN Cough  ondansetron Injectable 4 milliGRAM(s) IV Push every 6 hours PRN Nausea      Allergies    No Known Allergies    Intolerances        SUBJECTIVE: in bed in NAD, no acute events overnight     T(F): 99 (10-15-20 @ 13:42), Max: 99 (10-15-20 @ 13:42)  HR: 84 (10-15-20 @ 13:42) (62 - 110)  BP: 128/66 (10-15-20 @ 13:42) (115/76 - 163/68)  RR: 18 (10-15-20 @ 13:42) (16 - 24)  SpO2: 96% (10-15-20 @ 13:42) (96% - 98%)  Wt(kg): --    PHYSICAL EXAM:  GENERAL: NAD, well-groomed, well-developed  HEAD:  Atraumatic, Normocephalic  EYES: EOMI, PERRLA, conjunctiva and sclera clear  ENMT: No tonsillar erythema, exudates, or enlargement; Moist mucous membranes, Good dentition, No lesions  NECK: Supple, No JVD, Normal thyroid  CHEST/LUNG: Clear to  auscultation bilaterally; No rales, rhonchi, wheezing, or rubs  bilaterally  HEART: Regular rate and rhythm; No murmurs, rubs, or gallops  ABDOMEN: Soft, Nontender, Nondistended; Bowel sounds present  EXTREMITIES:  2+ Peripheral Pulses, No clubbing, cyanosis, or edema BL LE  SKIN: No rashes or lesions  NERVOUS SYSTEM:  Alert & Oriented X3, Good concentration; Motor Strength 5/5 B/L upper and lower extremities;   DTRs 2+ intact and symmetric, sensation intact BL    LABS:                        12.8   10.18 )-----------( 309      ( 15 Oct 2020 07:12 )             37.9     10-15    144  |  111<H>  |  8   ----------------------------<  111<H>  4.1   |  28  |  0.54    Ca    8.5      15 Oct 2020 07:12    TPro  7.5  /  Alb  3.4  /  TBili  0.2  /  DBili  x   /  AST  13<L>  /  ALT  15  /  AlkPhos  62  10-15    PT/INR - ( 14 Oct 2020 16:18 )   PT: 12.8 sec;   INR: 1.11 ratio         PTT - ( 14 Oct 2020 16:18 )  PTT:27.3 sec  Urinalysis Basic - ( 14 Oct 2020 16:47 )    Color: Yellow / Appearance: Clear / S.010 / pH: x  Gluc: x / Ketone: Negative  / Bili: Negative / Urobili: Negative mg/dL   Blood: x / Protein: Negative mg/dL / Nitrite: Negative   Leuk Esterase: Negative / RBC: 0-2 /HPF / WBC x   Sq Epi: x / Non Sq Epi: Occasional / Bacteria: Few      Cultures;   CAPILLARY BLOOD GLUCOSE        Lipid panel:     CARDIAC MARKERS ( 14 Oct 2020 16:18 )  <.015 ng/mL / x     / x     / x     / x            RADIOLOGY & ADDITIONAL TESTS:    < from: Xray Chest 1 View- PORTABLE-Urgent (10.14.20 @ 16:39) >    IMPRESSION:    Prominent vascular markings in the perihilar and bibasilar regions may reflect pulmonary congestion or may be related to low lung volumes. Repeat imaging with better inspiration could be obtained for further evaluation.      < end of copied text >    Imaging Personally Reviewed:  [ x] YES      Consultant(s) Notes Reviewed:  [x ] YES     Care Discussed with [x ] Consultants [X ] Patient [x ] Family  [x ]    [x ]  Other; RN

## 2020-10-15 NOTE — PROGRESS NOTE ADULT - ASSESSMENT
Pt is a 34yo F with PMH of asthma, psoriasis, kidney stone present to ED with fever, cough, SOB lack of smell and appetite x 5 days. Pt states her mom tested positive for Covid on 10/4th, her grandma was tested pos 10/8th and admitted to [facility] for covid pna. Pt tested neg for covid on 10/5, however start having symptom about 5 days ago. Pt states her cough and SOB has worsening since yesterday. Pt had Prednisolone 15mg from before and took it last night and today. Pt states cough persistent with worsening SOB and cough associated with fatigue and chest pain when cough. Tmax today was 103. Pt denies of any sore throat, congestion, palpitation, dizziness. Pt had telemedicine with PCP and was instructed come to the hospital    *sepsis secondary to Covid 19   appreciate ID consult   continue with steroids   monitor inflammatory markers   repeat covid test      Supplemental oxygen as needed   Tylenol as needed for fever   Albuterol as needed   Benzonatate as needed for cough    hycodan for cough     *Hypokalemia   resolved   *DVT ppx   Lovenox Pt is a 36yo F with PMH of asthma, psoriasis, kidney stone present to ED with fever, cough, SOB lack of smell and appetite x 5 days. Pt states her mom tested positive for Covid on 10/4th, her grandma was tested pos 10/8th and admitted to [facility] for covid pna. Pt tested neg for covid on 10/5, however start having symptom about 5 days ago. Pt states her cough and SOB has worsening since yesterday. Pt had Prednisolone 15mg from before and took it last night and today. Pt states cough persistent with worsening SOB and cough associated with fatigue and chest pain when cough. Tmax today was 103. Pt denies of any sore throat, congestion, palpitation, dizziness. Pt had telemedicine with PCP and was instructed come to the hospital    *sepsis secondary to Covid 19   appreciate ID consult   continue with steroids   monitor inflammatory markers   repeat covid test      Supplemental oxygen as needed   Tylenol as needed for fever   Albuterol as needed  for h/o asthma but currently satting well on room air   Benzonatate as needed for cough    hycodan for cough     *Hypokalemia   resolved   *DVT ppx   Lovenox

## 2020-10-15 NOTE — CONSULT NOTE ADULT - SUBJECTIVE AND OBJECTIVE BOX
Request for consultation received  Chart/Labs/Imaging reviewed  Assessment to follow.  Darío Chris MD  [phone]  ------------------------------ Request for consultation received  Chart/Labs/Imaging reviewed  Assessment to follow.  Darío Chris MD  [phone]  ------------------------------   [facility]  Division of Infectious Diseases  [phone]    MICHELLE ALAN  35y, Female  [id number]    HPI--  35F hx asthma, breast augmentation, liposuction, nephrolithiasis who has been  ill about 1 week with SOB, cough, fever, inital COVID PCR negative as outpatient despite mother testing + 2 days earlier and grandmother testing + as well, presented with worsening symptoms. Here COVID negative but patient states that she did the swab herself but only swabbed the anterior nares because she was coughing and did not want it placed all the way up. SaO2 98% RA. Given steroids, perhaps for asthma but indication not entirely clear. +Anosmia. No diarrhea.       PMH/PSH--  Asthma    H/O breast augmentation            Allergies--  No Known Allergies      Medications--  Antibiotics:   Immunologic: influenza   Vaccine 0.5 milliLiter(s) IntraMuscular once    Other: acetaminophen   Tablet .. PRN  ALBUTerol    90 MICROgram(s) HFA Inhaler  benzonatate PRN  dexAMETHasone  Injectable  enoxaparin Injectable  lactated ringers.  ondansetron Injectable PRN    Antimicrobials last 90 days per EMR: MEDICATIONS  (STANDING):        Social History--  EtOH: denies   Tobacco: denies   Drug Use: denies     Family/Marital History--  Single. 2 kids  Mother is [employer], asymptomatic PCR+. Patient, mother, grandmother live in the same home      Travel/Environmental/Occupational History:  Coordinator for anesthesia/pain management at [employer]    Review of Systems:  A >=10-point review of systems was obtained.   Review of systems otherwise negative except as previously noted.    Physical Exam--  Vital Signs: T(F): 99 (10-15-20 @ 13:42), Max: 99 (10-15-20 @ 13:42)  HR: 84 (10-15-20 @ 13:42)  BP: 128/66 (10-15-20 @ 13:42)  RR: 18 (10-15-20 @ 13:42)  SpO2: 96% (10-15-20 @ 13:42)  Wt(kg): --  General: Nontoxic-appearing Female in no acute distress.  HEENT: AT/NC. PERRL. EOMI. Anicteric. Conjunctiva pink and moist. Oropharynx clear. Dentition fair.  Neck: Not rigid. No sense of mass.  Nodes: None palpable.  Lungs: Diminished breath sounds bilaterally without rales, wheezing or rhonchi  Heart: Regular rate and rhythm. No Murmur. No rub. No gallop. No palpable thrill.  Abdomen: Bowel sounds present and normoactive. Soft. Nondistended. Nontender. No sense of mass. No organomegaly.  Back: No spinal tenderness. No costovertebral angle tenderness.   Extremities: No cyanosis or clubbing. No edema.   Skin: Warm. Dry. Good turgor. No rash. No vasculitic stigmata.  Psychiatric: Appropriate affect and mood for situation.         Laboratory & Imaging Data--  CBC                        12.8   10.18 )-----------( 309      ( 15 Oct 2020 07:12 )             37.9       Chemistries  10-15    144  |  111<H>  |  8   ----------------------------<  111<H>  4.1   |  28  |  0.54    Ca    8.5      15 Oct 2020 07:12    TPro  7.5  /  Alb  3.4  /  TBili  0.2  /  DBili  x   /  AST  13<L>  /  ALT  15  /  AlkPhos  62  10-15    Respiratory Viral Panel + COVID-19 by GIOVANNI (10.14.20 @ 16:18)    Rapid RVP Result: [facility]    SARS-CoV-2: [facility]: This Respiratory Panel uses polymerase chain reaction (PCR) to detect for  adenovirus; coronavirus (HKU1, NL63, 229E, OC43); human metapneumovirus  (hMPV); human enterovirus/rhinovirus (Entero/RV); influenza A; influenza  A/H1; influenza A/H3; influenza A/H1-2009; influenza B; parainfluenza  viruses 1, 2, 3, 4; respiratory syncytial virus; Mycoplasma pneumoniae;  Chlamydophila pneumoniae; and SARS-CoV-2.      Culture Data  None    COVID-19 Related Labs (last 5 days):  Ferritin, Serum: 148 ng/mL (10-15-20 @ 11:45)  Lactate Dehydrogenase, Serum: 189 U/L (10-15-20 @ 11:39)  C-Reactive Protein, Serum: 0.33 mg/dL (10-15-20 @ 11:39)  Lactate, Blood: 1.4 mmol/L (10-15-20 @ 07:12)  Auto Neutrophil #: 7.92 K/uL (10-15-20 @ 07:12)  Auto Lymphocyte #: 1.54 K/uL (10-15-20 @ 07:12)  Sedimentation Rate, Erythrocyte: 12 mm/hr (10-15-20 @ 07:12)  D-Dimer Assay, Quantitative: 153 ng/mL DDU (10-15-20 @ 07:12)  Ferritin, Serum: 154 ng/mL (10-15-20 @ 06:42)  C-Reactive Protein, Serum: 0.55 mg/dL (10-15-20 @ 05:26)  Procalcitonin, Serum: 0.03 ng/mL (10-15-20 @ 05:26)  Lactate Dehydrogenase, Serum: 147 U/L (10-15-20 @ 05:26)  Lactate Dehydrogenase, Serum: 148 U/L (10-15-20 @ 05:26)  Auto Neutrophil #: 6.93 K/uL (10-14-20 @ 16:18)  Auto Lymphocyte #: 0.53 K/uL (10-14-20 @ 16:18)  D-Dimer Assay, Quantitative: <150 ng/mL DDU (10-14-20 @ 16:18)  Lactate, Blood: 3.5 mmol/L (10-14-20 @ 16:18)  Activated Partial Thromboplastin Time: 27.3 sec (10-14-20 @ 16:18)  Prothrombin Time, Plasma: 12.8 sec (10-14-20 @ 16:18)  Troponin I, Serum: <.015 ng/mL (10-14-20 @ 16:18)

## 2020-10-15 NOTE — CONSULT NOTE ADULT - ASSESSMENT
Probable COVID  Negative test due to suboptimal specimen collection- will retest  Inflammatory markers not especially elevated though neutrophil:lymphocyte ratio elevated    Suggestions--  Restest PCR  Steroids as per primary team (asthma)  No role for remdesivir without + test  No role for convalescent plasma without + test  Precautions per protocol  DVT ppx per protocol    D/W Celio Bower    Thank you for the courtesy of this referral.  Darío Chris MD  Attending Physician  [facility]  Division of Infectious Diseases  [phone]

## 2020-10-16 LAB
ALBUMIN SERPL ELPH-MCNC: 3.2 G/DL — LOW (ref 3.3–5)
ALP SERPL-CCNC: 55 U/L — SIGNIFICANT CHANGE UP (ref 40–120)
ALT FLD-CCNC: 17 U/L — SIGNIFICANT CHANGE UP (ref 12–78)
ANION GAP SERPL CALC-SCNC: 6 MMOL/L — SIGNIFICANT CHANGE UP (ref 5–17)
AST SERPL-CCNC: 12 U/L — LOW (ref 15–37)
BILIRUB SERPL-MCNC: 0.2 MG/DL — SIGNIFICANT CHANGE UP (ref 0.2–1.2)
BUN SERPL-MCNC: 9 MG/DL — SIGNIFICANT CHANGE UP (ref 7–23)
CALCIUM SERPL-MCNC: 8 MG/DL — LOW (ref 8.5–10.1)
CHLORIDE SERPL-SCNC: 107 MMOL/L — SIGNIFICANT CHANGE UP (ref 96–108)
CO2 SERPL-SCNC: 28 MMOL/L — SIGNIFICANT CHANGE UP (ref 22–31)
CREAT SERPL-MCNC: 0.6 MG/DL — SIGNIFICANT CHANGE UP (ref 0.5–1.3)
GLUCOSE SERPL-MCNC: 112 MG/DL — HIGH (ref 70–99)
HCT VFR BLD CALC: 38 % — SIGNIFICANT CHANGE UP (ref 34.5–45)
HGB BLD-MCNC: 12.7 G/DL — SIGNIFICANT CHANGE UP (ref 11.5–15.5)
MAGNESIUM SERPL-MCNC: 1.9 MG/DL — SIGNIFICANT CHANGE UP (ref 1.6–2.6)
MCHC RBC-ENTMCNC: 30 PG — SIGNIFICANT CHANGE UP (ref 27–34)
MCHC RBC-ENTMCNC: 33.4 GM/DL — SIGNIFICANT CHANGE UP (ref 32–36)
MCV RBC AUTO: 89.8 FL — SIGNIFICANT CHANGE UP (ref 80–100)
NRBC # BLD: 0 /100 WBCS — SIGNIFICANT CHANGE UP (ref 0–0)
PHOSPHATE SERPL-MCNC: 1.8 MG/DL — LOW (ref 2.5–4.5)
PLATELET # BLD AUTO: 315 K/UL — SIGNIFICANT CHANGE UP (ref 150–400)
POTASSIUM SERPL-MCNC: 3.6 MMOL/L — SIGNIFICANT CHANGE UP (ref 3.5–5.3)
POTASSIUM SERPL-SCNC: 3.6 MMOL/L — SIGNIFICANT CHANGE UP (ref 3.5–5.3)
PROT SERPL-MCNC: 7.1 GM/DL — SIGNIFICANT CHANGE UP (ref 6–8.3)
RBC # BLD: 4.23 M/UL — SIGNIFICANT CHANGE UP (ref 3.8–5.2)
RBC # FLD: 12.6 % — SIGNIFICANT CHANGE UP (ref 10.3–14.5)
SARS-COV-2 RNA SPEC QL NAA+PROBE: DETECTED
SODIUM SERPL-SCNC: 141 MMOL/L — SIGNIFICANT CHANGE UP (ref 135–145)
WBC # BLD: 13.01 K/UL — HIGH (ref 3.8–10.5)
WBC # FLD AUTO: 13.01 K/UL — HIGH (ref 3.8–10.5)

## 2020-10-16 PROCEDURE — 99232 SBSQ HOSP IP/OBS MODERATE 35: CPT

## 2020-10-16 RX ORDER — TRAMADOL HYDROCHLORIDE 50 MG/1
50 TABLET ORAL EVERY 6 HOURS
Refills: 0 | Status: DISCONTINUED | OUTPATIENT
Start: 2020-10-16 | End: 2020-10-18

## 2020-10-16 RX ORDER — ZINC SULFATE TAB 220 MG (50 MG ZINC EQUIVALENT) 220 (50 ZN) MG
220 TAB ORAL DAILY
Refills: 0 | Status: DISCONTINUED | OUTPATIENT
Start: 2020-10-16 | End: 2020-10-18

## 2020-10-16 RX ORDER — MORPHINE SULFATE 50 MG/1
2 CAPSULE, EXTENDED RELEASE ORAL EVERY 6 HOURS
Refills: 0 | Status: DISCONTINUED | OUTPATIENT
Start: 2020-10-16 | End: 2020-10-18

## 2020-10-16 RX ORDER — ASCORBIC ACID 60 MG
500 TABLET,CHEWABLE ORAL DAILY
Refills: 0 | Status: DISCONTINUED | OUTPATIENT
Start: 2020-10-16 | End: 2020-10-18

## 2020-10-16 RX ADMIN — Medication 325 MILLIGRAM(S): at 13:43

## 2020-10-16 RX ADMIN — Medication 6 MILLIGRAM(S): at 05:16

## 2020-10-16 RX ADMIN — ENOXAPARIN SODIUM 40 MILLIGRAM(S): 100 INJECTION SUBCUTANEOUS at 12:50

## 2020-10-16 RX ADMIN — Medication 325 MILLIGRAM(S): at 08:31

## 2020-10-16 RX ADMIN — Medication 200 MILLIGRAM(S): at 22:00

## 2020-10-16 RX ADMIN — Medication 200 MILLIGRAM(S): at 05:16

## 2020-10-16 RX ADMIN — Medication 325 MILLIGRAM(S): at 12:49

## 2020-10-16 RX ADMIN — Medication 325 MILLIGRAM(S): at 09:31

## 2020-10-16 RX ADMIN — MORPHINE SULFATE 2 MILLIGRAM(S): 50 CAPSULE, EXTENDED RELEASE ORAL at 16:35

## 2020-10-16 RX ADMIN — Medication 500 MILLIGRAM(S): at 12:49

## 2020-10-16 RX ADMIN — MORPHINE SULFATE 2 MILLIGRAM(S): 50 CAPSULE, EXTENDED RELEASE ORAL at 16:20

## 2020-10-16 RX ADMIN — ZINC SULFATE TAB 220 MG (50 MG ZINC EQUIVALENT) 220 MILLIGRAM(S): 220 (50 ZN) TAB at 12:49

## 2020-10-16 NOTE — PROGRESS NOTE ADULT - ASSESSMENT
Probable COVID  Negative test due to suboptimal specimen collection- will retest  Inflammatory markers not especially elevated though neutrophil:lymphocyte ratio elevated    10/16: COVID. Not severe. Not requiring supplemental O2. No concern of bronchospasm on exam at present.     Suggestions--  Steroids as per primary team (asthma)  No role for remdesivir  No role for convalescent plasma at this time  Precautions per protocol  DVT ppx per protocol  No ID objection to outpatient management though social issues at home could delay discharge.    Left message for  Celio Bower    Thank you for the courtesy of this referral.  Please recall as needed. Will sign off at this juncture    Darío Chris MD  Attending Physician  [facility]  Division of Infectious Diseases  [phone]

## 2020-10-16 NOTE — PROGRESS NOTE ADULT - ASSESSMENT
Pt is a 36yo F with PMH of asthma, psoriasis, kidney stone present to ED with fever, cough, SOB lack of smell and appetite x 5 days. Pt states her mom tested positive for Covid on 10/4th, her grandma was tested pos 10/8th and admitted to [facility] for covid pna. Pt tested neg for covid on 10/5, however start having symptom about 5 days ago. Pt states her cough and SOB has worsening since yesterday. Pt had Prednisolone 15mg from before and took it last night and today. Pt states cough persistent with worsening SOB and cough associated with fatigue and chest pain when cough. Tmax today was 103. Pt denies of any sore throat, congestion, palpitation, dizziness. Pt had telemedicine with PCP and was instructed come to the hospital    *sepsis secondary to Covid 19   appreciate ID consult   continue with steroids   monitor inflammatory markers   repeat covid test pending results      Supplemental oxygen as needed  has been stable on room air   Tylenol as needed for fever   Albuterol as needed  for h/o asthma but currently satting well on room air   Benzonatate as needed for cough    hycodan for cough     *Hypokalemia   resolved   *DVT ppx   Lovenox Pt is a 34yo F with PMH of asthma, psoriasis, kidney stone present to ED with fever, cough, SOB lack of smell and appetite x 5 days. Pt states her mom tested positive for Covid on 10/4th, her grandma was tested pos 10/8th and admitted to [facility] for covid pna. Pt tested neg for covid on 10/5, however start having symptom about 5 days ago. Pt states her cough and SOB has worsening since yesterday. Pt had Prednisolone 15mg from before and took it last night and today. Pt states cough persistent with worsening SOB and cough associated with fatigue and chest pain when cough. Tmax today was 103. Pt denies of any sore throat, congestion, palpitation, dizziness. Pt had telemedicine with PCP and was instructed come to the hospital    *sepsis secondary to Covid 19   appreciate ID consult   continue with steroids   monitor inflammatory markers   repeat covid test pending results      Supplemental oxygen as needed  has been stable on room air   Tylenol as needed for fever   Albuterol as needed  for h/o asthma but currently satting well on room air   Benzonatate as needed for cough    hycodan for cough     *Hypokalemia   resolved       myalgias:   pain meds   *DVT ppx   Lovenox

## 2020-10-16 NOTE — PROGRESS NOTE ADULT - SUBJECTIVE AND OBJECTIVE BOX
Patient is a 35y old  Female who presents with a chief complaint of Covid 19 (14 Oct 2020 18:03)      OVERNIGHT EVENTS:  cough      MEDICATIONS  (STANDING):  ALBUTerol    90 MICROgram(s) HFA Inhaler 2 Puff(s) Inhalation every 4 hours  dexAMETHasone  Injectable 6 milliGRAM(s) IV Push daily  enoxaparin Injectable 40 milliGRAM(s) SubCutaneous daily  influenza   Vaccine 0.5 milliLiter(s) IntraMuscular once  lactated ringers. 500 milliLiter(s) (500 mL/Hr) IV Continuous <Continuous>    MEDICATIONS  (PRN):  acetaminophen   Tablet .. 325 milliGRAM(s) Oral every 4 hours PRN Temp greater or equal to 38C (100.4F), Mild Pain (1 - 3)  benzonatate 200 milliGRAM(s) Oral three times a day PRN Cough  hydrocodone/homatropine Syrup 5 milliLiter(s) Oral every 6 hours PRN Cough  ondansetron Injectable 4 milliGRAM(s) IV Push every 6 hours PRN Nausea      Allergies    No Known Allergies    Intolerances        SUBJECTIVE: in bed in NAD, no acute events overnight     Vital Signs Last 24 Hrs  T(C): 36.7 (16 Oct 2020 05:57), Max: 37.2 (15 Oct 2020 13:42)  T(F): 98 (16 Oct 2020 05:57), Max: 99 (15 Oct 2020 13:42)  HR: 66 (16 Oct 2020 05:57) (66 - 85)  BP: 112/64 (16 Oct 2020 05:57) (112/64 - 142/85)  BP(mean): --  RR: 18 (16 Oct 2020 05:57) (18 - 20)  SpO2: 96% (16 Oct 2020 05:57) (96% - 98%)    PHYSICAL EXAM:  GENERAL: NAD, well-groomed, well-developed  HEAD:  Atraumatic, Normocephalic  EYES: EOMI, PERRLA, conjunctiva and sclera clear  ENMT: No tonsillar erythema, exudates, or enlargement; Moist mucous membranes, Good dentition, No lesions  NECK: Supple, No JVD, Normal thyroid  CHEST/LUNG: Clear to  auscultation bilaterally; No rales, rhonchi, wheezing, or rubs  bilaterally  HEART: Regular rate and rhythm; No murmurs, rubs, or gallops  ABDOMEN: Soft, Nontender, Nondistended; Bowel sounds present  EXTREMITIES:  2+ Peripheral Pulses, No clubbing, cyanosis, or edema BL LE  SKIN: No rashes or lesions  NERVOUS SYSTEM:  Alert & Oriented X3, Good concentration; Motor Strength 5/5 B/L upper and lower extremities;   DTRs 2+ intact and symmetric, sensation intact BL    LABS:                        12.8   10.18 )-----------( 309      ( 15 Oct 2020 07:12 )             37.9     10-15    144  |  111<H>  |  8   ----------------------------<  111<H>  4.1   |  28  |  0.54    Ca    8.5      15 Oct 2020 07:12    TPro  7.5  /  Alb  3.4  /  TBili  0.2  /  DBili  x   /  AST  13<L>  /  ALT  15  /  AlkPhos  62  10-15    PT/INR - ( 14 Oct 2020 16:18 )   PT: 12.8 sec;   INR: 1.11 ratio         PTT - ( 14 Oct 2020 16:18 )  PTT:27.3 sec  Urinalysis Basic - ( 14 Oct 2020 16:47 )    Color: Yellow / Appearance: Clear / S.010 / pH: x  Gluc: x / Ketone: Negative  / Bili: Negative / Urobili: Negative mg/dL   Blood: x / Protein: Negative mg/dL / Nitrite: Negative   Leuk Esterase: Negative / RBC: 0-2 /HPF / WBC x   Sq Epi: x / Non Sq Epi: Occasional / Bacteria: Few      Cultures;   CAPILLARY BLOOD GLUCOSE        Lipid panel:     CARDIAC MARKERS ( 14 Oct 2020 16:18 )  <.015 ng/mL / x     / x     / x     / x            RADIOLOGY & ADDITIONAL TESTS:    < from: Xray Chest 1 View- PORTABLE-Urgent (10.14.20 @ 16:39) >    IMPRESSION:    Prominent vascular markings in the perihilar and bibasilar regions may reflect pulmonary congestion or may be related to low lung volumes. Repeat imaging with better inspiration could be obtained for further evaluation.      < end of copied text >    Imaging Personally Reviewed:  [ x] YES      Consultant(s) Notes Reviewed:  [x ] YES     Care Discussed with [x ] Consultants [X ] Patient [x ] Family  [x ]    [x ]  Other; RN Patient is a 35y old  Female who presents with a chief complaint of Covid 19 (14 Oct 2020 18:03)      OVERNIGHT EVENTS:  cough  c/o of  myalgias on today      MEDICATIONS  (STANDING):  ALBUTerol    90 MICROgram(s) HFA Inhaler 2 Puff(s) Inhalation every 4 hours  dexAMETHasone  Injectable 6 milliGRAM(s) IV Push daily  enoxaparin Injectable 40 milliGRAM(s) SubCutaneous daily  influenza   Vaccine 0.5 milliLiter(s) IntraMuscular once  lactated ringers. 500 milliLiter(s) (500 mL/Hr) IV Continuous <Continuous>    MEDICATIONS  (PRN):  acetaminophen   Tablet .. 325 milliGRAM(s) Oral every 4 hours PRN Temp greater or equal to 38C (100.4F), Mild Pain (1 - 3)  benzonatate 200 milliGRAM(s) Oral three times a day PRN Cough  hydrocodone/homatropine Syrup 5 milliLiter(s) Oral every 6 hours PRN Cough  ondansetron Injectable 4 milliGRAM(s) IV Push every 6 hours PRN Nausea      Allergies    No Known Allergies    Intolerances        SUBJECTIVE: in bed in NAD, no acute events overnight     Vital Signs Last 24 Hrs  T(C): 36.7 (16 Oct 2020 05:57), Max: 37.2 (15 Oct 2020 13:42)  T(F): 98 (16 Oct 2020 05:57), Max: 99 (15 Oct 2020 13:42)  HR: 66 (16 Oct 2020 05:57) (66 - 85)  BP: 112/64 (16 Oct 2020 05:57) (112/64 - 142/85)  BP(mean): --  RR: 18 (16 Oct 2020 05:57) (18 - 20)  SpO2: 96% (16 Oct 2020 05:57) (96% - 98%)    PHYSICAL EXAM:  GENERAL: NAD, well-groomed, well-developed  HEAD:  Atraumatic, Normocephalic  EYES: EOMI, PERRLA, conjunctiva and sclera clear  ENMT: No tonsillar erythema, exudates, or enlargement; Moist mucous membranes, Good dentition, No lesions  NECK: Supple, No JVD, Normal thyroid  CHEST/LUNG: Clear to  auscultation bilaterally; No rales, rhonchi, wheezing, or rubs  bilaterally  HEART: Regular rate and rhythm; No murmurs, rubs, or gallops  ABDOMEN: Soft, Nontender, Nondistended; Bowel sounds present  EXTREMITIES:  2+ Peripheral Pulses, No clubbing, cyanosis, or edema BL LE  SKIN: No rashes or lesions  NERVOUS SYSTEM:  Alert & Oriented X3, Good concentration; Motor Strength 5/5 B/L upper and lower extremities;   DTRs 2+ intact and symmetric, sensation intact BL    LABS:                        12.8   10.18 )-----------( 309      ( 15 Oct 2020 07:12 )             37.9     10-15    144  |  111<H>  |  8   ----------------------------<  111<H>  4.1   |  28  |  0.54    Ca    8.5      15 Oct 2020 07:12    TPro  7.5  /  Alb  3.4  /  TBili  0.2  /  DBili  x   /  AST  13<L>  /  ALT  15  /  AlkPhos  62  10-15    PT/INR - ( 14 Oct 2020 16:18 )   PT: 12.8 sec;   INR: 1.11 ratio         PTT - ( 14 Oct 2020 16:18 )  PTT:27.3 sec  Urinalysis Basic - ( 14 Oct 2020 16:47 )    Color: Yellow / Appearance: Clear / S.010 / pH: x  Gluc: x / Ketone: Negative  / Bili: Negative / Urobili: Negative mg/dL   Blood: x / Protein: Negative mg/dL / Nitrite: Negative   Leuk Esterase: Negative / RBC: 0-2 /HPF / WBC x   Sq Epi: x / Non Sq Epi: Occasional / Bacteria: Few      Cultures;   CAPILLARY BLOOD GLUCOSE        Lipid panel:     CARDIAC MARKERS ( 14 Oct 2020 16:18 )  <.015 ng/mL / x     / x     / x     / x            RADIOLOGY & ADDITIONAL TESTS:    < from: Xray Chest 1 View- PORTABLE-Urgent (10.14.20 @ 16:39) >    IMPRESSION:    Prominent vascular markings in the perihilar and bibasilar regions may reflect pulmonary congestion or may be related to low lung volumes. Repeat imaging with better inspiration could be obtained for further evaluation.      < end of copied text >    Imaging Personally Reviewed:  [ x] YES      Consultant(s) Notes Reviewed:  [x ] YES     Care Discussed with [x ] Consultants [X ] Patient [x ] Family  [x ]    [x ]  Other; RN

## 2020-10-16 NOTE — PROGRESS NOTE ADULT - SUBJECTIVE AND OBJECTIVE BOX
[facility]  Division of Infectious Diseases  [phone]    Name: MICHELLE ALAN  Age: 35y  Gender: Female  MRN: [id number]    Interval History--  Notes reviewed. Feeling a little better this afternoon. Cough better. Myalgias that were prominent this morning ("I felt like I was hit by a truck") are better this afternoon. 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   Repeat COVID PCR, as expected, positive.     Past Medical History--  Asthma    H/O breast augmentation        For details regarding the patient's social history, family history, and other miscellaneous elements, please refer the initial infectious diseases consultation and/or the admitting history and physical examination for this admission.    Allergies    No Known Allergies    Intolerances        Medications--  Antibiotics:    Immunologic:  influenza   Vaccine 0.5 milliLiter(s) IntraMuscular once    Other:  acetaminophen   Tablet .. PRN  ALBUTerol    90 MICROgram(s) HFA Inhaler  ascorbic acid  benzonatate PRN  dexAMETHasone  Injectable  enoxaparin Injectable  hydrocodone/homatropine Syrup PRN  lactated ringers.  ondansetron Injectable PRN  zinc sulfate      Review of Systems--  A 10-point review of systems was obtained.   Review of systems otherwise unchanged compared to prior visit except as previously noted.    Physical Examination--  Vital Signs: T(F): 97.6 (10-16-20 @ 10:21), Max: 98 (10-16-20 @ 00:16)  HR: 61 (10-16-20 @ 10:21)  BP: 119/71 (10-16-20 @ 10:21)  RR: 22 (10-16-20 @ 10:21)  SpO2: 96% (10-16-20 @ 10:21)  Wt(kg): --  General: Nontoxic-appearing Female in no acute distress.  HEENT: AT/NC. Anicteric. Conjunctiva pink and moist. Oropharynx clear.  Neck: Not rigid. No sense of mass.  Nodes: None palpable.  Lungs: Diminished breath sounds bilaterally without rales, wheezing or rhonchi  Heart: Regular rate and rhythm. No Murmur. No rub. No gallop. No palpable thrill.  Abdomen: Bowel sounds present and normoactive. Soft. Nondistended. Nontender. No sense of mass. No organomegaly.  Extremities: No cyanosis or clubbing. No edema.   Skin: Warm. Dry. Good turgor. No rash. No vasculitic stigmata.  Psychiatric: Appropriate affect and mood for situation.         Laboratory Studies--  CBC                        12.7   13.01 )-----------( 315      ( 16 Oct 2020 08:24 )             38.0       Chemistries  10-16    141  |  107  |  9   ----------------------------<  112<H>  3.6   |  28  |  0.60    Ca    8.0<L>      16 Oct 2020 08:24  Phos  1.8     10-16  Mg     1.9     10-16    TPro  7.1  /  Alb  3.2<L>  /  TBili  0.2  /  DBili  x   /  AST  12<L>  /  ALT  17  /  AlkPhos  55  10-16    COVID-19 Related Labs (last 5 days):  COVID-19 PCR: Detected (10-15-20 @ 17:50)  Ferritin, Serum: 148 ng/mL (10-15-20 @ 11:45)  Lactate Dehydrogenase, Serum: 189 U/L (10-15-20 @ 11:39)  C-Reactive Protein, Serum: 0.33 mg/dL (10-15-20 @ 11:39)  Lactate, Blood: 1.4 mmol/L (10-15-20 @ 07:12)  Auto Neutrophil #: 7.92 K/uL (10-15-20 @ 07:12)  Auto Lymphocyte #: 1.54 K/uL (10-15-20 @ 07:12)  Sedimentation Rate, Erythrocyte: 12 mm/hr (10-15-20 @ 07:12)  D-Dimer Assay, Quantitative: 153 ng/mL DDU (10-15-20 @ 07:12)  Ferritin, Serum: 154 ng/mL (10-15-20 @ 06:42)  C-Reactive Protein, Serum: 0.55 mg/dL (10-15-20 @ 05:26)  Procalcitonin, Serum: 0.03 ng/mL (10-15-20 @ 05:26)  Lactate Dehydrogenase, Serum: 147 U/L (10-15-20 @ 05:26)  Lactate Dehydrogenase, Serum: 148 U/L (10-15-20 @ 05:26)  Auto Neutrophil #: 6.93 K/uL (10-14-20 @ 16:18)  Auto Lymphocyte #: 0.53 K/uL (10-14-20 @ 16:18)  D-Dimer Assay, Quantitative: <150 ng/mL DDU (10-14-20 @ 16:18)  Lactate, Blood: 3.5 mmol/L (10-14-20 @ 16:18)  Activated Partial Thromboplastin Time: 27.3 sec (10-14-20 @ 16:18)  Prothrombin Time, Plasma: 12.8 sec (10-14-20 @ 16:18)  Troponin I, Serum: <.015 ng/mL (10-14-20 @ 16:18)      Culture Data    Culture - Blood (collected 15 Oct 2020 00:37)  Source: .Blood Blood-Peripheral  Preliminary Report (16 Oct 2020 01:08):    No growth to date.    Culture - Blood (collected 15 Oct 2020 00:35)  Source: .Blood Blood-Peripheral  Preliminary Report (16 Oct 2020 01:08):    No growth to date.    Culture - Urine (collected 15 Oct 2020 00:27)  Source: .Urine Clean Catch (Midstream)  Final Report (15 Oct 2020 21:45):    <10,000 CFU/mL Normal Urogenital Rina

## 2020-10-17 LAB
ALBUMIN SERPL ELPH-MCNC: 3.2 G/DL — LOW (ref 3.3–5)
ALP SERPL-CCNC: 60 U/L — SIGNIFICANT CHANGE UP (ref 40–120)
ALT FLD-CCNC: 25 U/L — SIGNIFICANT CHANGE UP (ref 12–78)
ANION GAP SERPL CALC-SCNC: 7 MMOL/L — SIGNIFICANT CHANGE UP (ref 5–17)
AST SERPL-CCNC: 14 U/L — LOW (ref 15–37)
BILIRUB SERPL-MCNC: 0.2 MG/DL — SIGNIFICANT CHANGE UP (ref 0.2–1.2)
BUN SERPL-MCNC: 11 MG/DL — SIGNIFICANT CHANGE UP (ref 7–23)
CALCIUM SERPL-MCNC: 8.3 MG/DL — LOW (ref 8.5–10.1)
CHLORIDE SERPL-SCNC: 105 MMOL/L — SIGNIFICANT CHANGE UP (ref 96–108)
CO2 SERPL-SCNC: 29 MMOL/L — SIGNIFICANT CHANGE UP (ref 22–31)
CREAT SERPL-MCNC: 0.57 MG/DL — SIGNIFICANT CHANGE UP (ref 0.5–1.3)
CRP SERPL-MCNC: 0.12 MG/DL — SIGNIFICANT CHANGE UP (ref 0–0.4)
FERRITIN SERPL-MCNC: 139 NG/ML — SIGNIFICANT CHANGE UP (ref 15–150)
GLUCOSE SERPL-MCNC: 94 MG/DL — SIGNIFICANT CHANGE UP (ref 70–99)
LDH SERPL L TO P-CCNC: 178 U/L — SIGNIFICANT CHANGE UP (ref 50–242)
MAGNESIUM SERPL-MCNC: 2.5 MG/DL — SIGNIFICANT CHANGE UP (ref 1.6–2.6)
PHOSPHATE SERPL-MCNC: 2.8 MG/DL — SIGNIFICANT CHANGE UP (ref 2.5–4.5)
POTASSIUM SERPL-MCNC: 3.8 MMOL/L — SIGNIFICANT CHANGE UP (ref 3.5–5.3)
POTASSIUM SERPL-SCNC: 3.8 MMOL/L — SIGNIFICANT CHANGE UP (ref 3.5–5.3)
PROT SERPL-MCNC: 7.1 GM/DL — SIGNIFICANT CHANGE UP (ref 6–8.3)
SODIUM SERPL-SCNC: 141 MMOL/L — SIGNIFICANT CHANGE UP (ref 135–145)

## 2020-10-17 PROCEDURE — 99232 SBSQ HOSP IP/OBS MODERATE 35: CPT

## 2020-10-17 RX ADMIN — MORPHINE SULFATE 2 MILLIGRAM(S): 50 CAPSULE, EXTENDED RELEASE ORAL at 06:18

## 2020-10-17 RX ADMIN — MORPHINE SULFATE 2 MILLIGRAM(S): 50 CAPSULE, EXTENDED RELEASE ORAL at 14:02

## 2020-10-17 RX ADMIN — MORPHINE SULFATE 2 MILLIGRAM(S): 50 CAPSULE, EXTENDED RELEASE ORAL at 22:46

## 2020-10-17 RX ADMIN — ZINC SULFATE TAB 220 MG (50 MG ZINC EQUIVALENT) 220 MILLIGRAM(S): 220 (50 ZN) TAB at 11:52

## 2020-10-17 RX ADMIN — MORPHINE SULFATE 2 MILLIGRAM(S): 50 CAPSULE, EXTENDED RELEASE ORAL at 14:17

## 2020-10-17 RX ADMIN — Medication 500 MILLIGRAM(S): at 11:49

## 2020-10-17 RX ADMIN — Medication 6 MILLIGRAM(S): at 06:18

## 2020-10-17 RX ADMIN — MORPHINE SULFATE 2 MILLIGRAM(S): 50 CAPSULE, EXTENDED RELEASE ORAL at 21:48

## 2020-10-17 RX ADMIN — Medication 200 MILLIGRAM(S): at 06:18

## 2020-10-17 RX ADMIN — ENOXAPARIN SODIUM 40 MILLIGRAM(S): 100 INJECTION SUBCUTANEOUS at 11:49

## 2020-10-17 NOTE — PROGRESS NOTE ADULT - SUBJECTIVE AND OBJECTIVE BOX
Patient is a 35y old  Female who presents with a chief complaint of Covid 19 (14 Oct 2020 18:03)      OVERNIGHT EVENTS:  cough  c/o of  myalgias on 10/16/2020    MEDICATIONS  (STANDING):  ALBUTerol    90 MICROgram(s) HFA Inhaler 2 Puff(s) Inhalation every 4 hours  ascorbic acid 500 milliGRAM(s) Oral daily  dexAMETHasone  Injectable 6 milliGRAM(s) IV Push daily  enoxaparin Injectable 40 milliGRAM(s) SubCutaneous daily  influenza   Vaccine 0.5 milliLiter(s) IntraMuscular once  lactated ringers. 500 milliLiter(s) (500 mL/Hr) IV Continuous <Continuous>  zinc sulfate 220 milliGRAM(s) Oral daily    MEDICATIONS  (PRN):  acetaminophen   Tablet .. 325 milliGRAM(s) Oral every 4 hours PRN Temp greater or equal to 38C (100.4F), Mild Pain (1 - 3)  benzonatate 200 milliGRAM(s) Oral three times a day PRN Cough  hydrocodone/homatropine Syrup 5 milliLiter(s) Oral every 6 hours PRN Cough  morphine  - Injectable 2 milliGRAM(s) IV Push every 6 hours PRN Severe Pain (7 - 10)  ondansetron Injectable 4 milliGRAM(s) IV Push every 6 hours PRN Nausea  traMADol 50 milliGRAM(s) Oral every 6 hours PRN Moderate Pain (4 - 6)      Allergies    No Known Allergies    Intolerances        SUBJECTIVE: in bed in NAD, no acute events overnight     Vital Signs Last 24 Hrs  T(C): 36.7 (16 Oct 2020 05:57), Max: 37.2 (15 Oct 2020 13:42)  T(F): 98 (16 Oct 2020 05:57), Max: 99 (15 Oct 2020 13:42)  HR: 66 (16 Oct 2020 05:57) (66 - 85)  BP: 112/64 (16 Oct 2020 05:57) (112/64 - 142/85)  BP(mean): --  RR: 18 (16 Oct 2020 05:57) (18 - 20)  SpO2: 96% (16 Oct 2020 05:57) (96% - 98%)    PHYSICAL EXAM:  GENERAL: NAD, well-groomed, well-developed  HEAD:  Atraumatic, Normocephalic  EYES: EOMI, PERRLA, conjunctiva and sclera clear  ENMT: No tonsillar erythema, exudates, or enlargement; Moist mucous membranes, Good dentition, No lesions  NECK: Supple, No JVD, Normal thyroid  CHEST/LUNG: Clear to  auscultation bilaterally; No rales, rhonchi, wheezing, or rubs  bilaterally  HEART: Regular rate and rhythm; No murmurs, rubs, or gallops  ABDOMEN: Soft, Nontender, Nondistended; Bowel sounds present  EXTREMITIES:  2+ Peripheral Pulses, No clubbing, cyanosis, or edema BL LE  SKIN: No rashes or lesions  NERVOUS SYSTEM:  Alert & Oriented X3, Good concentration; Motor Strength 5/5 B/L upper and lower extremities;   DTRs 2+ intact and symmetric, sensation intact BL    LABS:                        12.8   10.18 )-----------( 309      ( 15 Oct 2020 07:12 )             37.9     10-15    144  |  111<H>  |  8   ----------------------------<  111<H>  4.1   |  28  |  0.54    Ca    8.5      15 Oct 2020 07:12    TPro  7.5  /  Alb  3.4  /  TBili  0.2  /  DBili  x   /  AST  13<L>  /  ALT  15  /  AlkPhos  62  10-15    PT/INR - ( 14 Oct 2020 16:18 )   PT: 12.8 sec;   INR: 1.11 ratio         PTT - ( 14 Oct 2020 16:18 )  PTT:27.3 sec  Urinalysis Basic - ( 14 Oct 2020 16:47 )    Color: Yellow / Appearance: Clear / S.010 / pH: x  Gluc: x / Ketone: Negative  / Bili: Negative / Urobili: Negative mg/dL   Blood: x / Protein: Negative mg/dL / Nitrite: Negative   Leuk Esterase: Negative / RBC: 0-2 /HPF / WBC x   Sq Epi: x / Non Sq Epi: Occasional / Bacteria: Few      Cultures;   CAPILLARY BLOOD GLUCOSE        Lipid panel:     CARDIAC MARKERS ( 14 Oct 2020 16:18 )  <.015 ng/mL / x     / x     / x     / x            RADIOLOGY & ADDITIONAL TESTS:    < from: Xray Chest 1 View- PORTABLE-Urgent (10.14.20 @ 16:39) >    IMPRESSION:    Prominent vascular markings in the perihilar and bibasilar regions may reflect pulmonary congestion or may be related to low lung volumes. Repeat imaging with better inspiration could be obtained for further evaluation.      < end of copied text >    Imaging Personally Reviewed:  [ x] YES      Consultant(s) Notes Reviewed:  [x ] YES     Care Discussed with [x ] Consultants [X ] Patient [x ] Family  [x ]    [x ]  Other; RN

## 2020-10-17 NOTE — PROGRESS NOTE ADULT - PROBLEM/PLAN-1
DISPLAY PLAN FREE TEXT 55F hx of SLE breast ca uterine ca HTN presents with a cc of chest pain, a/w palpitations waking her up from sleep approx 1am, sharp constant lasted until today 830, per PMD instructed to present to ED for eval. last had stress x3 years ago w/o abnormality on chart review. LE swelling at baseline. Denies n/v/f/c. Denies headache, syncope, lightheadedness, dizziness. Denies chest palpitations, abdominal pain. Denies dysuria, hematuria, hematochezia, BRBPR, tarry stools, diarrhea, constipation.

## 2020-10-17 NOTE — PROGRESS NOTE ADULT - ASSESSMENT
Pt is a 36yo F with PMH of asthma, psoriasis, kidney stone present to ED with fever, cough, SOB lack of smell and appetite x 5 days. Pt states her mom tested positive for Covid on 10/4th, her grandma was tested pos 10/8th and admitted to [facility] for covid pna. Pt tested neg for covid on 10/5, however start having symptom about 5 days ago. Pt states her cough and SOB has worsening since yesterday. Pt had Prednisolone 15mg from before and took it last night and today. Pt states cough persistent with worsening SOB and cough associated with fatigue and chest pain when cough. Tmax today was 103. Pt denies of any sore throat, congestion, palpitation, dizziness. Pt had telemedicine with PCP and was instructed come to the hospital    *sepsis secondary to Covid 19   appreciate ID consult   continue with steroids   monitor inflammatory markers on today 10/17/2020  repeat covid test positive       Supplemental oxygen as needed  has been stable on room air   Tylenol as needed for fever  been afebrile >48 hours  Albuterol as needed  for h/o asthma but currently satting well on room air   Benzonatate as needed for cough    hycodan for cough     *Hypokalemia   resolved       myalgias: pain meds   *DVT ppx   Lovenox Pt is a 34yo F with PMH of asthma, psoriasis, kidney stone present to ED with fever, cough, SOB lack of smell and appetite x 5 days. Pt states her mom tested positive for Covid on 10/4th, her grandma was tested pos 10/8th and admitted to [facility] for covid pna. Pt tested neg for covid on 10/5, however start having symptom about 5 days ago. Pt states her cough and SOB has worsening since yesterday. Pt had Prednisolone 15mg from before and took it last night and today. Pt states cough persistent with worsening SOB and cough associated with fatigue and chest pain when cough. Tmax today was 103. Pt denies of any sore throat, congestion, palpitation, dizziness. Pt had telemedicine with PCP and was instructed come to the hospital    *sepsis secondary to Covid 19   appreciate ID consult   continue with steroids   monitor inflammatory markers on today 10/17/2020  repeat covid test positive       Supplemental oxygen as needed  has been stable on room air   Tylenol as needed for fever  been afebrile >48 hours  Albuterol as needed  for h/o asthma but currently satting well on room air   Benzonatate as needed for cough    hycodan for cough   possible d/c in am, I ambulated on room air lowest oxygen was 94%  *Hypokalemia   resolved       myalgias: pain meds   *DVT ppx   Lovenox

## 2020-10-18 ENCOUNTER — TRANSCRIPTION ENCOUNTER (OUTPATIENT)
Age: 35
End: 2020-10-18

## 2020-10-18 VITALS
DIASTOLIC BLOOD PRESSURE: 83 MMHG | SYSTOLIC BLOOD PRESSURE: 127 MMHG | RESPIRATION RATE: 18 BRPM | TEMPERATURE: 98 F | OXYGEN SATURATION: 98 % | HEART RATE: 70 BPM

## 2020-10-18 PROCEDURE — 99238 HOSP IP/OBS DSCHRG MGMT 30/<: CPT

## 2020-10-18 RX ORDER — TRAMADOL HYDROCHLORIDE 50 MG/1
1 TABLET ORAL
Qty: 20 | Refills: 0
Start: 2020-10-18 | End: 2020-10-22

## 2020-10-18 RX ORDER — ALBUTEROL 90 UG/1
2 AEROSOL, METERED ORAL
Qty: 0 | Refills: 0 | DISCHARGE

## 2020-10-18 RX ORDER — IPRATROPIUM/ALBUTEROL SULFATE 18-103MCG
3 AEROSOL WITH ADAPTER (GRAM) INHALATION
Qty: 360 | Refills: 0
Start: 2020-10-18 | End: 2020-11-16

## 2020-10-18 RX ORDER — ASCORBIC ACID 60 MG
1 TABLET,CHEWABLE ORAL
Qty: 30 | Refills: 0
Start: 2020-10-18 | End: 2020-11-16

## 2020-10-18 RX ORDER — PREDNISOLONE 5 MG
15 TABLET ORAL
Qty: 0 | Refills: 0 | DISCHARGE

## 2020-10-18 RX ORDER — ZINC SULFATE TAB 220 MG (50 MG ZINC EQUIVALENT) 220 (50 ZN) MG
1 TAB ORAL
Qty: 30 | Refills: 0
Start: 2020-10-18 | End: 2020-11-16

## 2020-10-18 RX ORDER — ALBUTEROL 90 UG/1
2 AEROSOL, METERED ORAL
Qty: 1 | Refills: 0
Start: 2020-10-18 | End: 2020-11-16

## 2020-10-18 RX ADMIN — MORPHINE SULFATE 2 MILLIGRAM(S): 50 CAPSULE, EXTENDED RELEASE ORAL at 09:21

## 2020-10-18 RX ADMIN — Medication 500 MILLIGRAM(S): at 11:50

## 2020-10-18 RX ADMIN — Medication 6 MILLIGRAM(S): at 05:41

## 2020-10-18 RX ADMIN — ZINC SULFATE TAB 220 MG (50 MG ZINC EQUIVALENT) 220 MILLIGRAM(S): 220 (50 ZN) TAB at 11:55

## 2020-10-18 RX ADMIN — MORPHINE SULFATE 2 MILLIGRAM(S): 50 CAPSULE, EXTENDED RELEASE ORAL at 09:06

## 2020-10-18 RX ADMIN — ENOXAPARIN SODIUM 40 MILLIGRAM(S): 100 INJECTION SUBCUTANEOUS at 11:50

## 2020-10-18 NOTE — DISCHARGE NOTE PROVIDER - NSDCCPCAREPLAN_GEN_ALL_CORE_FT
PRINCIPAL DISCHARGE DIAGNOSIS  Diagnosis: COVID-19  Assessment and Plan of Treatment:       SECONDARY DISCHARGE DIAGNOSES  Diagnosis: Asthma, unspecified asthma severity, unspecified whether complicated, unspecified whether persistent  Assessment and Plan of Treatment: Asthma, unspecified asthma severity, unspecified whether complicated, unspecified whether persistent    Diagnosis: Dyspnea  Assessment and Plan of Treatment:

## 2020-10-18 NOTE — DISCHARGE NOTE PROVIDER - NSDCMRMEDTOKEN_GEN_ALL_CORE_FT
Albuterol (Eqv-Proventil HFA) 90 mcg/inh inhalation aerosol: 2 puff(s) inhaled every 6 hours, As Needed  ascorbic acid 500 mg oral tablet: 1 tab(s) orally once a day  benzonatate 100 mg oral capsule: 2 cap(s) orally 3 times a day, As needed, Cough  ipratropium-albuterol 0.5 mg-2.5 mg/3 mLinhalation solution: 3 milliliter(s) by nebulizer every 6 hours, As Needed   traMADol 50 mg oral tablet: 1 tab(s) orally every 6 hours, As needed, Moderate Pain (4 - 6) MDD:4  zinc sulfate 220 mg oral capsule: 1 cap(s) orally once a day   Albuterol (Eqv-Proventil HFA) 90 mcg/inh inhalation aerosol: 2 puff(s) inhaled every 6 hours, As Needed  ascorbic acid 500 mg oral tablet: 1 tab(s) orally once a day  benzonatate 100 mg oral capsule: 2 cap(s) orally 3 times a day, As needed, Cough  ipratropium-albuterol 0.5 mg-2.5 mg/3 mLinhalation solution: 3 milliliter(s) by nebulizer every 6 hours, As Needed   prednisoLONE 15 mg/5 mL oral syrup: 5 milliliter(s) orally once a day   traMADol 50 mg oral tablet: 1 tab(s) orally every 6 hours, As needed, Moderate Pain (4 - 6) MDD:4  zinc sulfate 220 mg oral capsule: 1 cap(s) orally once a day

## 2020-10-18 NOTE — DISCHARGE NOTE NURSING/CASE MANAGEMENT/SOCIAL WORK - PATIENT PORTAL LINK FT
You can access the FollowMyHealth Patient Portal offered by [facility] by registering at the following website: [email or web address]. By joining [employer]’s FollowMyHealth portal, you will also be able to view your health information using other applications (apps) compatible with our system.
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
## 2020-10-19 LAB
B PERT DNA SPEC QL NAA+PROBE: SIGNIFICANT CHANGE UP
C PNEUM DNA SPEC QL NAA+PROBE: SIGNIFICANT CHANGE UP
FLUAV H1 2009 PAND RNA SPEC QL NAA+PROBE: SIGNIFICANT CHANGE UP
FLUAV H1 RNA SPEC QL NAA+PROBE: SIGNIFICANT CHANGE UP
FLUAV H3 RNA SPEC QL NAA+PROBE: SIGNIFICANT CHANGE UP
FLUAV SUBTYP SPEC NAA+PROBE: SIGNIFICANT CHANGE UP
FLUBV RNA SPEC QL NAA+PROBE: SIGNIFICANT CHANGE UP
HADV DNA SPEC QL NAA+PROBE: SIGNIFICANT CHANGE UP
HCOV PNL SPEC NAA+PROBE: SIGNIFICANT CHANGE UP
HMPV RNA SPEC QL NAA+PROBE: SIGNIFICANT CHANGE UP
HPIV1 RNA SPEC QL NAA+PROBE: SIGNIFICANT CHANGE UP
HPIV2 RNA SPEC QL NAA+PROBE: SIGNIFICANT CHANGE UP
HPIV3 RNA SPEC QL NAA+PROBE: SIGNIFICANT CHANGE UP
HPIV4 RNA SPEC QL NAA+PROBE: SIGNIFICANT CHANGE UP
IL6 FLD-MCNC: <0.7 PG/ML — SIGNIFICANT CHANGE UP (ref 0–15.5)
RSV RNA SPEC QL NAA+PROBE: SIGNIFICANT CHANGE UP
RV+EV RNA SPEC QL NAA+PROBE: SIGNIFICANT CHANGE UP

## 2020-10-20 LAB
CULTURE RESULTS: SIGNIFICANT CHANGE UP
CULTURE RESULTS: SIGNIFICANT CHANGE UP
SPECIMEN SOURCE: SIGNIFICANT CHANGE UP
SPECIMEN SOURCE: SIGNIFICANT CHANGE UP

## 2020-10-28 DIAGNOSIS — A41.89 OTHER SPECIFIED SEPSIS: ICD-10-CM

## 2020-10-28 DIAGNOSIS — L40.9 PSORIASIS, UNSPECIFIED: ICD-10-CM

## 2020-10-28 DIAGNOSIS — R06.02 SHORTNESS OF BREATH: ICD-10-CM

## 2020-10-28 DIAGNOSIS — U07.1 COVID-19: ICD-10-CM

## 2020-10-28 DIAGNOSIS — J45.909 UNSPECIFIED ASTHMA, UNCOMPLICATED: ICD-10-CM

## 2020-10-28 DIAGNOSIS — E87.6 HYPOKALEMIA: ICD-10-CM

## 2020-12-22 NOTE — H&P ADULT - NSHPPOASURGSITEINCISION_GEN_ALL_CORE
no Suturegard Body: The suture ends were repeatedly re-tightened and re-clamped to achieve the desired tissue expansion.

## 2021-03-23 ENCOUNTER — RESULT REVIEW (OUTPATIENT)
Age: 36
End: 2021-03-23

## 2022-07-28 ENCOUNTER — INPATIENT (INPATIENT)
Facility: HOSPITAL | Age: 37
LOS: 6 days | Discharge: HOME | DRG: 202 | End: 2022-08-04
Attending: INTERNAL MEDICINE | Admitting: INTERNAL MEDICINE
Payer: COMMERCIAL

## 2022-07-28 VITALS — HEIGHT: 62 IN

## 2022-07-28 DIAGNOSIS — R10.9 UNSPECIFIED ABDOMINAL PAIN: ICD-10-CM

## 2022-07-28 DIAGNOSIS — Z98.82 BREAST IMPLANT STATUS: Chronic | ICD-10-CM

## 2022-07-28 PROBLEM — J45.909 UNSPECIFIED ASTHMA, UNCOMPLICATED: Chronic | Status: ACTIVE | Noted: 2020-10-14

## 2022-07-28 LAB
ALBUMIN SERPL ELPH-MCNC: 4.3 G/DL — SIGNIFICANT CHANGE UP (ref 3.3–5)
ALP SERPL-CCNC: 64 U/L — SIGNIFICANT CHANGE UP (ref 40–120)
ALT FLD-CCNC: 11 U/L — SIGNIFICANT CHANGE UP (ref 10–45)
ANION GAP SERPL CALC-SCNC: 16 MMOL/L — SIGNIFICANT CHANGE UP (ref 5–17)
APTT BLD: 24.3 SEC — LOW (ref 27.5–35.5)
AST SERPL-CCNC: 17 U/L — SIGNIFICANT CHANGE UP (ref 10–40)
BASE EXCESS BLDV CALC-SCNC: -10.1 MMOL/L — LOW (ref -2–2)
BASOPHILS # BLD AUTO: 0.07 K/UL — SIGNIFICANT CHANGE UP (ref 0–0.2)
BASOPHILS NFR BLD AUTO: 0.4 % — SIGNIFICANT CHANGE UP (ref 0–2)
BILIRUB SERPL-MCNC: 0.6 MG/DL — SIGNIFICANT CHANGE UP (ref 0.2–1.2)
BUN SERPL-MCNC: 11 MG/DL — SIGNIFICANT CHANGE UP (ref 7–23)
CA-I SERPL-SCNC: 1.11 MMOL/L — LOW (ref 1.15–1.33)
CALCIUM SERPL-MCNC: 8.8 MG/DL — SIGNIFICANT CHANGE UP (ref 8.4–10.5)
CHLORIDE BLDV-SCNC: 106 MMOL/L — SIGNIFICANT CHANGE UP (ref 96–108)
CHLORIDE SERPL-SCNC: 103 MMOL/L — SIGNIFICANT CHANGE UP (ref 96–108)
CO2 BLDV-SCNC: 16 MMOL/L — LOW (ref 22–26)
CO2 SERPL-SCNC: 20 MMOL/L — LOW (ref 22–31)
CREAT SERPL-MCNC: 0.6 MG/DL — SIGNIFICANT CHANGE UP (ref 0.5–1.3)
EGFR: 119 ML/MIN/1.73M2 — SIGNIFICANT CHANGE UP
EOSINOPHIL # BLD AUTO: 0.18 K/UL — SIGNIFICANT CHANGE UP (ref 0–0.5)
EOSINOPHIL NFR BLD AUTO: 1.1 % — SIGNIFICANT CHANGE UP (ref 0–6)
GAS PNL BLDV: 136 MMOL/L — SIGNIFICANT CHANGE UP (ref 136–145)
GAS PNL BLDV: SIGNIFICANT CHANGE UP
GLUCOSE BLDV-MCNC: 217 MG/DL — HIGH (ref 70–99)
GLUCOSE SERPL-MCNC: 187 MG/DL — HIGH (ref 70–99)
HCG SERPL-ACNC: <2 MIU/ML — SIGNIFICANT CHANGE UP
HCO3 BLDV-SCNC: 15 MMOL/L — LOW (ref 22–29)
HCT VFR BLD CALC: 41.3 % — SIGNIFICANT CHANGE UP (ref 34.5–45)
HCT VFR BLDA CALC: 38 % — SIGNIFICANT CHANGE UP (ref 34.5–46.5)
HGB BLD CALC-MCNC: 12.5 G/DL — SIGNIFICANT CHANGE UP (ref 11.7–16.1)
HGB BLD-MCNC: 13.6 G/DL — SIGNIFICANT CHANGE UP (ref 11.5–15.5)
IMM GRANULOCYTES NFR BLD AUTO: 0.5 % — SIGNIFICANT CHANGE UP (ref 0–1.5)
INR BLD: 1.11 RATIO — SIGNIFICANT CHANGE UP (ref 0.88–1.16)
LACTATE BLDV-MCNC: 8.2 MMOL/L — CRITICAL HIGH (ref 0.7–2)
LACTATE SERPL-SCNC: 5.4 MMOL/L — CRITICAL HIGH (ref 0.7–2)
LYMPHOCYTES # BLD AUTO: 41.7 % — SIGNIFICANT CHANGE UP (ref 13–44)
LYMPHOCYTES # BLD AUTO: 7.11 K/UL — HIGH (ref 1–3.3)
MCHC RBC-ENTMCNC: 29.6 PG — SIGNIFICANT CHANGE UP (ref 27–34)
MCHC RBC-ENTMCNC: 32.9 GM/DL — SIGNIFICANT CHANGE UP (ref 32–36)
MCV RBC AUTO: 89.8 FL — SIGNIFICANT CHANGE UP (ref 80–100)
MONOCYTES # BLD AUTO: 1.13 K/UL — HIGH (ref 0–0.9)
MONOCYTES NFR BLD AUTO: 6.6 % — SIGNIFICANT CHANGE UP (ref 2–14)
NEUTROPHILS # BLD AUTO: 8.5 K/UL — HIGH (ref 1.8–7.4)
NEUTROPHILS NFR BLD AUTO: 49.7 % — SIGNIFICANT CHANGE UP (ref 43–77)
NRBC # BLD: 0 /100 WBCS — SIGNIFICANT CHANGE UP (ref 0–0)
PCO2 BLDV: 32 MMHG — LOW (ref 39–42)
PH BLDV: 7.29 — LOW (ref 7.32–7.43)
PLATELET # BLD AUTO: 485 K/UL — HIGH (ref 150–400)
PO2 BLDV: 67 MMHG — HIGH (ref 25–45)
POTASSIUM BLDV-SCNC: 3.2 MMOL/L — LOW (ref 3.5–5.1)
POTASSIUM SERPL-MCNC: 2.8 MMOL/L — CRITICAL LOW (ref 3.5–5.3)
POTASSIUM SERPL-SCNC: 2.8 MMOL/L — CRITICAL LOW (ref 3.5–5.3)
PROT SERPL-MCNC: 7.6 G/DL — SIGNIFICANT CHANGE UP (ref 6–8.3)
PROTHROM AB SERPL-ACNC: 12.8 SEC — SIGNIFICANT CHANGE UP (ref 10.5–13.4)
RAPID RVP RESULT: SIGNIFICANT CHANGE UP
RBC # BLD: 4.6 M/UL — SIGNIFICANT CHANGE UP (ref 3.8–5.2)
RBC # FLD: 14.5 % — SIGNIFICANT CHANGE UP (ref 10.3–14.5)
SAO2 % BLDV: 93.8 % — HIGH (ref 67–88)
SARS-COV-2 RNA SPEC QL NAA+PROBE: SIGNIFICANT CHANGE UP
SODIUM SERPL-SCNC: 139 MMOL/L — SIGNIFICANT CHANGE UP (ref 135–145)
TROPONIN T, HIGH SENSITIVITY RESULT: <6 NG/L — SIGNIFICANT CHANGE UP (ref 0–51)
WBC # BLD: 17.07 K/UL — HIGH (ref 3.8–10.5)
WBC # FLD AUTO: 17.07 K/UL — HIGH (ref 3.8–10.5)

## 2022-07-28 PROCEDURE — 73030 X-RAY EXAM OF SHOULDER: CPT | Mod: 26,LT

## 2022-07-28 PROCEDURE — 73060 X-RAY EXAM OF HUMERUS: CPT | Mod: 26,LT

## 2022-07-28 PROCEDURE — 74177 CT ABD & PELVIS W/CONTRAST: CPT | Mod: 26,MA

## 2022-07-28 PROCEDURE — 73090 X-RAY EXAM OF FOREARM: CPT | Mod: 26,LT

## 2022-07-28 PROCEDURE — 71260 CT THORAX DX C+: CPT | Mod: 26,MA

## 2022-07-28 PROCEDURE — 73070 X-RAY EXAM OF ELBOW: CPT | Mod: 26,LT

## 2022-07-28 PROCEDURE — 71045 X-RAY EXAM CHEST 1 VIEW: CPT | Mod: 26

## 2022-07-28 PROCEDURE — 12001 RPR S/N/AX/GEN/TRNK 2.5CM/<: CPT

## 2022-07-28 PROCEDURE — 99291 CRITICAL CARE FIRST HOUR: CPT | Mod: 25

## 2022-07-28 PROCEDURE — 70450 CT HEAD/BRAIN W/O DYE: CPT | Mod: 26,MA

## 2022-07-28 PROCEDURE — 73110 X-RAY EXAM OF WRIST: CPT | Mod: 26,LT

## 2022-07-28 PROCEDURE — 72125 CT NECK SPINE W/O DYE: CPT | Mod: 26,MA

## 2022-07-28 RX ORDER — MORPHINE SULFATE 50 MG/1
4 CAPSULE, EXTENDED RELEASE ORAL ONCE
Refills: 0 | Status: DISCONTINUED | OUTPATIENT
Start: 2022-07-28 | End: 2022-07-28

## 2022-07-28 RX ORDER — POTASSIUM CHLORIDE 20 MEQ
40 PACKET (EA) ORAL ONCE
Refills: 0 | Status: COMPLETED | OUTPATIENT
Start: 2022-07-28 | End: 2022-07-28

## 2022-07-28 RX ORDER — MORPHINE SULFATE 50 MG/1
2 CAPSULE, EXTENDED RELEASE ORAL ONCE
Refills: 0 | Status: DISCONTINUED | OUTPATIENT
Start: 2022-07-28 | End: 2022-07-28

## 2022-07-28 RX ORDER — ALBUTEROL 90 UG/1
2.5 AEROSOL, METERED ORAL
Refills: 0 | Status: COMPLETED | OUTPATIENT
Start: 2022-07-28 | End: 2022-07-28

## 2022-07-28 RX ORDER — CEFAZOLIN SODIUM 1 G
2000 VIAL (EA) INJECTION ONCE
Refills: 0 | Status: COMPLETED | OUTPATIENT
Start: 2022-07-28 | End: 2022-07-28

## 2022-07-28 RX ORDER — SODIUM CHLORIDE 9 MG/ML
1000 INJECTION INTRAMUSCULAR; INTRAVENOUS; SUBCUTANEOUS
Refills: 0 | Status: DISCONTINUED | OUTPATIENT
Start: 2022-07-28 | End: 2022-08-04

## 2022-07-28 RX ORDER — POTASSIUM CHLORIDE 20 MEQ
10 PACKET (EA) ORAL
Refills: 0 | Status: DISCONTINUED | OUTPATIENT
Start: 2022-07-28 | End: 2022-07-28

## 2022-07-28 RX ORDER — FENTANYL CITRATE 50 UG/ML
50 INJECTION INTRAVENOUS ONCE
Refills: 0 | Status: DISCONTINUED | OUTPATIENT
Start: 2022-07-28 | End: 2022-07-28

## 2022-07-28 RX ORDER — SODIUM CHLORIDE 9 MG/ML
1000 INJECTION INTRAMUSCULAR; INTRAVENOUS; SUBCUTANEOUS ONCE
Refills: 0 | Status: COMPLETED | OUTPATIENT
Start: 2022-07-28 | End: 2022-07-28

## 2022-07-28 RX ORDER — ACETAMINOPHEN 500 MG
1000 TABLET ORAL ONCE
Refills: 0 | Status: COMPLETED | OUTPATIENT
Start: 2022-07-28 | End: 2022-07-28

## 2022-07-28 RX ADMIN — ALBUTEROL 2.5 MILLIGRAM(S): 90 AEROSOL, METERED ORAL at 11:24

## 2022-07-28 RX ADMIN — ALBUTEROL 2.5 MILLIGRAM(S): 90 AEROSOL, METERED ORAL at 12:05

## 2022-07-28 RX ADMIN — ALBUTEROL 2.5 MILLIGRAM(S): 90 AEROSOL, METERED ORAL at 15:20

## 2022-07-28 RX ADMIN — ALBUTEROL 2.5 MILLIGRAM(S): 90 AEROSOL, METERED ORAL at 12:35

## 2022-07-28 RX ADMIN — Medication 100 MILLIGRAM(S): at 11:29

## 2022-07-28 RX ADMIN — ALBUTEROL 2.5 MILLIGRAM(S): 90 AEROSOL, METERED ORAL at 11:22

## 2022-07-28 RX ADMIN — ALBUTEROL 2.5 MILLIGRAM(S): 90 AEROSOL, METERED ORAL at 15:22

## 2022-07-28 RX ADMIN — MORPHINE SULFATE 4 MILLIGRAM(S): 50 CAPSULE, EXTENDED RELEASE ORAL at 13:03

## 2022-07-28 RX ADMIN — MORPHINE SULFATE 2 MILLIGRAM(S): 50 CAPSULE, EXTENDED RELEASE ORAL at 23:06

## 2022-07-28 RX ADMIN — Medication 400 MILLIGRAM(S): at 12:06

## 2022-07-28 RX ADMIN — MORPHINE SULFATE 4 MILLIGRAM(S): 50 CAPSULE, EXTENDED RELEASE ORAL at 16:25

## 2022-07-28 RX ADMIN — FENTANYL CITRATE 50 MICROGRAM(S): 50 INJECTION INTRAVENOUS at 11:22

## 2022-07-28 RX ADMIN — MORPHINE SULFATE 4 MILLIGRAM(S): 50 CAPSULE, EXTENDED RELEASE ORAL at 20:39

## 2022-07-28 RX ADMIN — SODIUM CHLORIDE 1000 MILLILITER(S): 9 INJECTION INTRAMUSCULAR; INTRAVENOUS; SUBCUTANEOUS at 16:26

## 2022-07-28 RX ADMIN — ALBUTEROL 2.5 MILLIGRAM(S): 90 AEROSOL, METERED ORAL at 15:21

## 2022-07-28 RX ADMIN — ALBUTEROL 2.5 MILLIGRAM(S): 90 AEROSOL, METERED ORAL at 12:44

## 2022-07-28 RX ADMIN — ALBUTEROL 2.5 MILLIGRAM(S): 90 AEROSOL, METERED ORAL at 11:23

## 2022-07-28 RX ADMIN — Medication 40 MILLIEQUIVALENT(S): at 16:26

## 2022-07-28 NOTE — ED ADULT NURSE REASSESSMENT NOTE - NS ED NURSE REASSESS COMMENT FT1
MD Charles removed bipap at this time. Patient tolerating well. CT scan called that patient is ready for CT.
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
## 2022-07-28 NOTE — ED ADULT NURSE REASSESSMENT NOTE - NS ED NURSE REASSESS COMMENT FT1
1503: break coverage RN: pt returned from x-ray, pt states chest tightness and mild SOB. MD Charles made aware. repeat EKG performed. nebulizers administered as per MD orders. VSS. plan of care discussed. safety and comfort measures maintained.
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
## 2022-07-28 NOTE — ED PROVIDER NOTE - PHYSICAL EXAMINATION
Gen - +respiratory distress, alert  HEENT - NCAT, EOMI  Neck - supple  Resp - poor air entry but BS b/l, diffuse wheezing, +increased WOB  CV -  tachycardic, no m/r/g  Abd - soft, NT, ND; no guarding or rebound  MSK - stable pelvis, NVI distally throughout, +laceration to L dorsal forearm ~2 cm, no obvious deformity  Extrem - no LE edema/erythema/tenderness  Neuro - no focal motor or sensation deficits, limited exam  Skin - warm, well perfused
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
## 2022-07-28 NOTE — ED ADULT NURSE NOTE - DRUG PRE-SCREENING (DAST -1)
You possibly have COVID-19 infection.  Wear a mask and quarantine at home.  Drink plenty of fluids.  Take Tylenol for fevers and pain.  Return here in 12 hours if your symptoms worsen  
Statement Selected
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
## 2022-07-28 NOTE — ED PROVIDER NOTE - WET READ LAUNCH FT
There is 1 Wet Read(s) to document. There are 4 Wet Read(s) to document. There are no Wet Read(s) to document.

## 2022-07-29 LAB — LACTATE SERPL-SCNC: 2 MMOL/L — SIGNIFICANT CHANGE UP (ref 0.7–2)

## 2022-07-29 RX ORDER — OXYCODONE HYDROCHLORIDE 5 MG/1
5 TABLET ORAL EVERY 8 HOURS
Refills: 0 | Status: DISCONTINUED | OUTPATIENT
Start: 2022-07-29 | End: 2022-07-30

## 2022-07-29 RX ORDER — ALPRAZOLAM 0.25 MG
0.5 TABLET ORAL ONCE
Refills: 0 | Status: DISCONTINUED | OUTPATIENT
Start: 2022-07-29 | End: 2022-07-29

## 2022-07-29 RX ORDER — ACETAMINOPHEN 500 MG
650 TABLET ORAL ONCE
Refills: 0 | Status: COMPLETED | OUTPATIENT
Start: 2022-07-29 | End: 2022-07-29

## 2022-07-29 RX ORDER — ALBUTEROL 90 UG/1
1.25 AEROSOL, METERED ORAL EVERY 6 HOURS
Refills: 0 | Status: DISCONTINUED | OUTPATIENT
Start: 2022-07-29 | End: 2022-07-30

## 2022-07-29 RX ADMIN — Medication 20 MILLIGRAM(S): at 14:52

## 2022-07-29 RX ADMIN — Medication 0.5 MILLIGRAM(S): at 21:25

## 2022-07-29 RX ADMIN — OXYCODONE HYDROCHLORIDE 5 MILLIGRAM(S): 5 TABLET ORAL at 03:42

## 2022-07-29 RX ADMIN — Medication 20 MILLIGRAM(S): at 21:30

## 2022-07-29 RX ADMIN — Medication 0.5 MILLIGRAM(S): at 12:22

## 2022-07-29 RX ADMIN — OXYCODONE HYDROCHLORIDE 5 MILLIGRAM(S): 5 TABLET ORAL at 11:51

## 2022-07-29 RX ADMIN — OXYCODONE HYDROCHLORIDE 5 MILLIGRAM(S): 5 TABLET ORAL at 13:00

## 2022-07-29 RX ADMIN — ALBUTEROL 1.25 MILLIGRAM(S): 90 AEROSOL, METERED ORAL at 11:51

## 2022-07-29 RX ADMIN — Medication 650 MILLIGRAM(S): at 00:26

## 2022-07-29 RX ADMIN — MORPHINE SULFATE 2 MILLIGRAM(S): 50 CAPSULE, EXTENDED RELEASE ORAL at 00:12

## 2022-07-29 RX ADMIN — SODIUM CHLORIDE 100 MILLILITER(S): 9 INJECTION INTRAMUSCULAR; INTRAVENOUS; SUBCUTANEOUS at 00:04

## 2022-07-29 RX ADMIN — ALBUTEROL 1.25 MILLIGRAM(S): 90 AEROSOL, METERED ORAL at 19:02

## 2022-07-29 RX ADMIN — OXYCODONE HYDROCHLORIDE 5 MILLIGRAM(S): 5 TABLET ORAL at 04:15

## 2022-07-29 RX ADMIN — OXYCODONE HYDROCHLORIDE 5 MILLIGRAM(S): 5 TABLET ORAL at 19:20

## 2022-07-29 RX ADMIN — Medication 20 MILLIGRAM(S): at 06:37

## 2022-07-29 RX ADMIN — ALBUTEROL 1.25 MILLIGRAM(S): 90 AEROSOL, METERED ORAL at 06:38

## 2022-07-29 NOTE — PATIENT PROFILE ADULT - FALL HARM RISK - HARM RISK INTERVENTIONS

## 2022-07-29 NOTE — PATIENT PROFILE ADULT - PUBLIC BENEFITS
----- Message from Tali Sanford MD sent at 7/11/2019  1:10 PM CDT -----  All of Alessandro's strep testing was negative. Please let me know if he does not have improvement in his symptoms.   no

## 2022-07-30 LAB
ANION GAP SERPL CALC-SCNC: 11 MMOL/L — SIGNIFICANT CHANGE UP (ref 5–17)
APTT BLD: 24.8 SEC — LOW (ref 27.5–35.5)
BASE EXCESS BLDA CALC-SCNC: -0.7 MMOL/L — SIGNIFICANT CHANGE UP (ref -2–3)
BUN SERPL-MCNC: 12 MG/DL — SIGNIFICANT CHANGE UP (ref 7–23)
CALCIUM SERPL-MCNC: 9 MG/DL — SIGNIFICANT CHANGE UP (ref 8.4–10.5)
CHLORIDE SERPL-SCNC: 105 MMOL/L — SIGNIFICANT CHANGE UP (ref 96–108)
CO2 BLDA-SCNC: 24 MMOL/L — SIGNIFICANT CHANGE UP (ref 19–24)
CO2 SERPL-SCNC: 24 MMOL/L — SIGNIFICANT CHANGE UP (ref 22–31)
CREAT SERPL-MCNC: 0.6 MG/DL — SIGNIFICANT CHANGE UP (ref 0.5–1.3)
EGFR: 119 ML/MIN/1.73M2 — SIGNIFICANT CHANGE UP
GAS PNL BLDA: SIGNIFICANT CHANGE UP
GLUCOSE BLDC GLUCOMTR-MCNC: 152 MG/DL — HIGH (ref 70–99)
GLUCOSE SERPL-MCNC: 121 MG/DL — HIGH (ref 70–99)
HCO3 BLDA-SCNC: 23 MMOL/L — SIGNIFICANT CHANGE UP (ref 21–28)
HCT VFR BLD CALC: 36.1 % — SIGNIFICANT CHANGE UP (ref 34.5–45)
HCT VFR BLD CALC: 40.6 % — SIGNIFICANT CHANGE UP (ref 34.5–45)
HGB BLD-MCNC: 12 G/DL — SIGNIFICANT CHANGE UP (ref 11.5–15.5)
HGB BLD-MCNC: 13.3 G/DL — SIGNIFICANT CHANGE UP (ref 11.5–15.5)
INR BLD: 1.05 RATIO — SIGNIFICANT CHANGE UP (ref 0.88–1.16)
MCHC RBC-ENTMCNC: 29.7 PG — SIGNIFICANT CHANGE UP (ref 27–34)
MCHC RBC-ENTMCNC: 30.2 PG — SIGNIFICANT CHANGE UP (ref 27–34)
MCHC RBC-ENTMCNC: 32.8 GM/DL — SIGNIFICANT CHANGE UP (ref 32–36)
MCHC RBC-ENTMCNC: 33.2 GM/DL — SIGNIFICANT CHANGE UP (ref 32–36)
MCV RBC AUTO: 90.6 FL — SIGNIFICANT CHANGE UP (ref 80–100)
MCV RBC AUTO: 90.9 FL — SIGNIFICANT CHANGE UP (ref 80–100)
NRBC # BLD: 0 /100 WBCS — SIGNIFICANT CHANGE UP (ref 0–0)
NRBC # BLD: 0 /100 WBCS — SIGNIFICANT CHANGE UP (ref 0–0)
PCO2 BLDA: 36 MMHG — SIGNIFICANT CHANGE UP (ref 32–45)
PH BLDA: 7.42 — SIGNIFICANT CHANGE UP (ref 7.35–7.45)
PLATELET # BLD AUTO: 401 K/UL — HIGH (ref 150–400)
PLATELET # BLD AUTO: 411 K/UL — HIGH (ref 150–400)
PO2 BLDA: 179 MMHG — HIGH (ref 83–108)
POTASSIUM SERPL-MCNC: 4.1 MMOL/L — SIGNIFICANT CHANGE UP (ref 3.5–5.3)
POTASSIUM SERPL-SCNC: 4.1 MMOL/L — SIGNIFICANT CHANGE UP (ref 3.5–5.3)
PROTHROM AB SERPL-ACNC: 12.1 SEC — SIGNIFICANT CHANGE UP (ref 10.5–13.4)
RBC # BLD: 3.97 M/UL — SIGNIFICANT CHANGE UP (ref 3.8–5.2)
RBC # BLD: 4.48 M/UL — SIGNIFICANT CHANGE UP (ref 3.8–5.2)
RBC # FLD: 14.8 % — HIGH (ref 10.3–14.5)
RBC # FLD: 14.8 % — HIGH (ref 10.3–14.5)
SAO2 % BLDA: 98.8 % — HIGH (ref 94–98)
SODIUM SERPL-SCNC: 140 MMOL/L — SIGNIFICANT CHANGE UP (ref 135–145)
WBC # BLD: 18.51 K/UL — HIGH (ref 3.8–10.5)
WBC # BLD: 18.84 K/UL — HIGH (ref 3.8–10.5)
WBC # FLD AUTO: 18.51 K/UL — HIGH (ref 3.8–10.5)
WBC # FLD AUTO: 18.84 K/UL — HIGH (ref 3.8–10.5)

## 2022-07-30 PROCEDURE — 99223 1ST HOSP IP/OBS HIGH 75: CPT | Mod: GC

## 2022-07-30 PROCEDURE — 71045 X-RAY EXAM CHEST 1 VIEW: CPT | Mod: 26

## 2022-07-30 RX ORDER — OXYCODONE HYDROCHLORIDE 5 MG/1
5 TABLET ORAL EVERY 6 HOURS
Refills: 0 | Status: DISCONTINUED | OUTPATIENT
Start: 2022-07-30 | End: 2022-08-01

## 2022-07-30 RX ORDER — IPRATROPIUM/ALBUTEROL SULFATE 18-103MCG
3 AEROSOL WITH ADAPTER (GRAM) INHALATION ONCE
Refills: 0 | Status: COMPLETED | OUTPATIENT
Start: 2022-07-30 | End: 2022-07-30

## 2022-07-30 RX ORDER — ALPRAZOLAM 0.25 MG
0.5 TABLET ORAL THREE TIMES A DAY
Refills: 0 | Status: DISCONTINUED | OUTPATIENT
Start: 2022-07-30 | End: 2022-08-04

## 2022-07-30 RX ORDER — ENOXAPARIN SODIUM 100 MG/ML
40 INJECTION SUBCUTANEOUS EVERY 24 HOURS
Refills: 0 | Status: DISCONTINUED | OUTPATIENT
Start: 2022-07-30 | End: 2022-08-03

## 2022-07-30 RX ORDER — IPRATROPIUM/ALBUTEROL SULFATE 18-103MCG
3 AEROSOL WITH ADAPTER (GRAM) INHALATION EVERY 4 HOURS
Refills: 0 | Status: DISCONTINUED | OUTPATIENT
Start: 2022-07-30 | End: 2022-08-04

## 2022-07-30 RX ORDER — POLYETHYLENE GLYCOL 3350 17 G/17G
17 POWDER, FOR SOLUTION ORAL DAILY
Refills: 0 | Status: DISCONTINUED | OUTPATIENT
Start: 2022-07-30 | End: 2022-08-01

## 2022-07-30 RX ADMIN — OXYCODONE HYDROCHLORIDE 5 MILLIGRAM(S): 5 TABLET ORAL at 23:06

## 2022-07-30 RX ADMIN — ALBUTEROL 1.25 MILLIGRAM(S): 90 AEROSOL, METERED ORAL at 01:00

## 2022-07-30 RX ADMIN — ALBUTEROL 1.25 MILLIGRAM(S): 90 AEROSOL, METERED ORAL at 05:28

## 2022-07-30 RX ADMIN — Medication 20 MILLIGRAM(S): at 21:45

## 2022-07-30 RX ADMIN — Medication 20 MILLIGRAM(S): at 05:29

## 2022-07-30 RX ADMIN — ENOXAPARIN SODIUM 40 MILLIGRAM(S): 100 INJECTION SUBCUTANEOUS at 12:11

## 2022-07-30 RX ADMIN — POLYETHYLENE GLYCOL 3350 17 GRAM(S): 17 POWDER, FOR SOLUTION ORAL at 16:02

## 2022-07-30 RX ADMIN — Medication 3 MILLILITER(S): at 21:45

## 2022-07-30 RX ADMIN — Medication 3 MILLILITER(S): at 10:26

## 2022-07-30 RX ADMIN — Medication 3 MILLILITER(S): at 16:02

## 2022-07-30 RX ADMIN — Medication 20 MILLIGRAM(S): at 15:59

## 2022-07-30 RX ADMIN — OXYCODONE HYDROCHLORIDE 5 MILLIGRAM(S): 5 TABLET ORAL at 19:29

## 2022-07-30 RX ADMIN — OXYCODONE HYDROCHLORIDE 5 MILLIGRAM(S): 5 TABLET ORAL at 05:40

## 2022-07-30 RX ADMIN — OXYCODONE HYDROCHLORIDE 5 MILLIGRAM(S): 5 TABLET ORAL at 13:00

## 2022-07-30 RX ADMIN — OXYCODONE HYDROCHLORIDE 5 MILLIGRAM(S): 5 TABLET ORAL at 07:35

## 2022-07-30 RX ADMIN — Medication 0.5 MILLIGRAM(S): at 16:12

## 2022-07-30 RX ADMIN — Medication 10 MILLIGRAM(S): at 22:30

## 2022-07-30 RX ADMIN — OXYCODONE HYDROCHLORIDE 5 MILLIGRAM(S): 5 TABLET ORAL at 12:11

## 2022-07-30 RX ADMIN — OXYCODONE HYDROCHLORIDE 5 MILLIGRAM(S): 5 TABLET ORAL at 17:59

## 2022-07-30 RX ADMIN — Medication 3 MILLILITER(S): at 18:00

## 2022-07-30 NOTE — RAPID RESPONSE TEAM SUMMARY - NSADDTLFINDINGSRRT_GEN_ALL_CORE
RECOMMENDATIONS to primary team:  [ ] f/u ABG. Likely etiology asthma exacerbation  [ ] Would recomm RECOMMENDATIONS to primary team:  [ ] f/u ABG. Likely etiology asthma exacerbation  [ ] Recommend pulmonary consultation as patient has been admitted since the 7/28 with increased work of breathing. Perhaps needs adjustment in LABA?  [ ] c/w duonebs and steroids until pulmonary recommendations  [] Would keep on BIPAP for now given it helps her work of breathing and touch base with pulm  [ ]  RECOMMENDATIONS to primary team:  [ ] f/u ABG. Likely etiology asthma exacerbation  [ ] Recommend pulmonary consultation as patient has been admitted since the 7/28 with increased work of breathing. Perhaps needs adjustment in LABA?  [ ] c/w duonebs and steroids until pulmonary recommendations  [] Would keep on BIPAP for now given it helps her work of breathing and touch base with pulmonary  [ ] Anxiety may be a contributing factor, would recommend a PRN anxiolytic order for  patient  [ ] f/u CXR
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
## 2022-07-30 NOTE — CONSULT NOTE ADULT - TIME BILLING
review of laboratory data, radiology results, discussion with primary team\patient, and monitoring for potential decompensation. Interventions were performed as documented above.

## 2022-07-30 NOTE — PROVIDER CONTACT NOTE (CHANGE IN STATUS NOTIFICATION) - SITUATION
pt tachypneic, complaining of chest pain and tightness. bp elevated  156/110 repeated vitals 150/96.
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
## 2022-07-31 PROCEDURE — 99233 SBSQ HOSP IP/OBS HIGH 50: CPT | Mod: GC

## 2022-07-31 RX ORDER — ACETAMINOPHEN 500 MG
650 TABLET ORAL ONCE
Refills: 0 | Status: COMPLETED | OUTPATIENT
Start: 2022-07-31 | End: 2022-07-31

## 2022-07-31 RX ORDER — HYDROMORPHONE HYDROCHLORIDE 2 MG/ML
4 INJECTION INTRAMUSCULAR; INTRAVENOUS; SUBCUTANEOUS ONCE
Refills: 0 | Status: DISCONTINUED | OUTPATIENT
Start: 2022-07-31 | End: 2022-07-31

## 2022-07-31 RX ORDER — SENNA PLUS 8.6 MG/1
2 TABLET ORAL ONCE
Refills: 0 | Status: COMPLETED | OUTPATIENT
Start: 2022-07-31 | End: 2022-07-31

## 2022-07-31 RX ORDER — SENNA PLUS 8.6 MG/1
2 TABLET ORAL AT BEDTIME
Refills: 0 | Status: DISCONTINUED | OUTPATIENT
Start: 2022-07-31 | End: 2022-08-04

## 2022-07-31 RX ADMIN — Medication 650 MILLIGRAM(S): at 23:48

## 2022-07-31 RX ADMIN — HYDROMORPHONE HYDROCHLORIDE 4 MILLIGRAM(S): 2 INJECTION INTRAMUSCULAR; INTRAVENOUS; SUBCUTANEOUS at 13:20

## 2022-07-31 RX ADMIN — Medication 3 MILLILITER(S): at 02:04

## 2022-07-31 RX ADMIN — Medication 20 MILLIGRAM(S): at 21:39

## 2022-07-31 RX ADMIN — Medication 3 MILLILITER(S): at 07:39

## 2022-07-31 RX ADMIN — SENNA PLUS 2 TABLET(S): 8.6 TABLET ORAL at 21:39

## 2022-07-31 RX ADMIN — OXYCODONE HYDROCHLORIDE 5 MILLIGRAM(S): 5 TABLET ORAL at 17:53

## 2022-07-31 RX ADMIN — OXYCODONE HYDROCHLORIDE 5 MILLIGRAM(S): 5 TABLET ORAL at 07:39

## 2022-07-31 RX ADMIN — Medication 0.5 MILLIGRAM(S): at 19:32

## 2022-07-31 RX ADMIN — Medication 3 MILLILITER(S): at 17:53

## 2022-07-31 RX ADMIN — Medication 40 MILLIGRAM(S): at 12:15

## 2022-07-31 RX ADMIN — Medication 20 MILLIGRAM(S): at 15:56

## 2022-07-31 RX ADMIN — Medication 0.5 MILLIGRAM(S): at 02:03

## 2022-07-31 RX ADMIN — Medication 3 MILLILITER(S): at 10:49

## 2022-07-31 RX ADMIN — SENNA PLUS 2 TABLET(S): 8.6 TABLET ORAL at 01:56

## 2022-07-31 RX ADMIN — Medication 650 MILLIGRAM(S): at 22:48

## 2022-07-31 RX ADMIN — Medication 3 MILLILITER(S): at 15:57

## 2022-07-31 RX ADMIN — HYDROMORPHONE HYDROCHLORIDE 4 MILLIGRAM(S): 2 INJECTION INTRAMUSCULAR; INTRAVENOUS; SUBCUTANEOUS at 12:14

## 2022-07-31 RX ADMIN — POLYETHYLENE GLYCOL 3350 17 GRAM(S): 17 POWDER, FOR SOLUTION ORAL at 12:17

## 2022-07-31 RX ADMIN — OXYCODONE HYDROCHLORIDE 5 MILLIGRAM(S): 5 TABLET ORAL at 08:40

## 2022-07-31 RX ADMIN — Medication 3 MILLILITER(S): at 21:40

## 2022-07-31 RX ADMIN — ENOXAPARIN SODIUM 40 MILLIGRAM(S): 100 INJECTION SUBCUTANEOUS at 12:18

## 2022-07-31 RX ADMIN — Medication 20 MILLIGRAM(S): at 07:41
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
## 2022-08-01 ENCOUNTER — TRANSCRIPTION ENCOUNTER (OUTPATIENT)
Age: 37
End: 2022-08-01

## 2022-08-01 LAB
ALBUMIN SERPL ELPH-MCNC: 3.9 G/DL — SIGNIFICANT CHANGE UP (ref 3.3–5)
ALP SERPL-CCNC: 59 U/L — SIGNIFICANT CHANGE UP (ref 40–120)
ALT FLD-CCNC: 10 U/L — SIGNIFICANT CHANGE UP (ref 10–45)
ANION GAP SERPL CALC-SCNC: 12 MMOL/L — SIGNIFICANT CHANGE UP (ref 5–17)
AST SERPL-CCNC: 9 U/L — LOW (ref 10–40)
BILIRUB SERPL-MCNC: <0.1 MG/DL — LOW (ref 0.2–1.2)
BUN SERPL-MCNC: 15 MG/DL — SIGNIFICANT CHANGE UP (ref 7–23)
CALCIUM SERPL-MCNC: 9.4 MG/DL — SIGNIFICANT CHANGE UP (ref 8.4–10.5)
CHLORIDE SERPL-SCNC: 102 MMOL/L — SIGNIFICANT CHANGE UP (ref 96–108)
CO2 SERPL-SCNC: 25 MMOL/L — SIGNIFICANT CHANGE UP (ref 22–31)
CREAT SERPL-MCNC: 0.49 MG/DL — LOW (ref 0.5–1.3)
EGFR: 125 ML/MIN/1.73M2 — SIGNIFICANT CHANGE UP
GLUCOSE SERPL-MCNC: 150 MG/DL — HIGH (ref 70–99)
HCT VFR BLD CALC: 38.8 % — SIGNIFICANT CHANGE UP (ref 34.5–45)
HGB BLD-MCNC: 13.1 G/DL — SIGNIFICANT CHANGE UP (ref 11.5–15.5)
MAGNESIUM SERPL-MCNC: 2.1 MG/DL — SIGNIFICANT CHANGE UP (ref 1.6–2.6)
MCHC RBC-ENTMCNC: 30.9 PG — SIGNIFICANT CHANGE UP (ref 27–34)
MCHC RBC-ENTMCNC: 33.8 GM/DL — SIGNIFICANT CHANGE UP (ref 32–36)
MCV RBC AUTO: 91.5 FL — SIGNIFICANT CHANGE UP (ref 80–100)
NRBC # BLD: 0 /100 WBCS — SIGNIFICANT CHANGE UP (ref 0–0)
PHOSPHATE SERPL-MCNC: 2.9 MG/DL — SIGNIFICANT CHANGE UP (ref 2.5–4.5)
PLATELET # BLD AUTO: 421 K/UL — HIGH (ref 150–400)
POTASSIUM SERPL-MCNC: 3.9 MMOL/L — SIGNIFICANT CHANGE UP (ref 3.5–5.3)
POTASSIUM SERPL-SCNC: 3.9 MMOL/L — SIGNIFICANT CHANGE UP (ref 3.5–5.3)
PROT SERPL-MCNC: 7.1 G/DL — SIGNIFICANT CHANGE UP (ref 6–8.3)
RBC # BLD: 4.24 M/UL — SIGNIFICANT CHANGE UP (ref 3.8–5.2)
RBC # FLD: 14.7 % — HIGH (ref 10.3–14.5)
SODIUM SERPL-SCNC: 139 MMOL/L — SIGNIFICANT CHANGE UP (ref 135–145)
WBC # BLD: 18.18 K/UL — HIGH (ref 3.8–10.5)
WBC # FLD AUTO: 18.18 K/UL — HIGH (ref 3.8–10.5)

## 2022-08-01 PROCEDURE — 99233 SBSQ HOSP IP/OBS HIGH 50: CPT

## 2022-08-01 RX ORDER — TRAMADOL HYDROCHLORIDE 50 MG/1
50 TABLET ORAL EVERY 8 HOURS
Refills: 0 | Status: DISCONTINUED | OUTPATIENT
Start: 2022-08-01 | End: 2022-08-03

## 2022-08-01 RX ORDER — POLYETHYLENE GLYCOL 3350 17 G/17G
17 POWDER, FOR SOLUTION ORAL
Refills: 0 | Status: DISCONTINUED | OUTPATIENT
Start: 2022-08-01 | End: 2022-08-04

## 2022-08-01 RX ORDER — ACETAMINOPHEN 500 MG
650 TABLET ORAL EVERY 6 HOURS
Refills: 0 | Status: DISCONTINUED | OUTPATIENT
Start: 2022-08-01 | End: 2022-08-02

## 2022-08-01 RX ORDER — HYDROMORPHONE HYDROCHLORIDE 2 MG/ML
2 INJECTION INTRAMUSCULAR; INTRAVENOUS; SUBCUTANEOUS ONCE
Refills: 0 | Status: DISCONTINUED | OUTPATIENT
Start: 2022-08-01 | End: 2022-08-01

## 2022-08-01 RX ADMIN — OXYCODONE HYDROCHLORIDE 5 MILLIGRAM(S): 5 TABLET ORAL at 00:04

## 2022-08-01 RX ADMIN — POLYETHYLENE GLYCOL 3350 17 GRAM(S): 17 POWDER, FOR SOLUTION ORAL at 17:59

## 2022-08-01 RX ADMIN — Medication 3 MILLILITER(S): at 02:09

## 2022-08-01 RX ADMIN — Medication 3 MILLILITER(S): at 09:57

## 2022-08-01 RX ADMIN — ENOXAPARIN SODIUM 40 MILLIGRAM(S): 100 INJECTION SUBCUTANEOUS at 13:12

## 2022-08-01 RX ADMIN — Medication 0.5 MILLIGRAM(S): at 08:43

## 2022-08-01 RX ADMIN — HYDROMORPHONE HYDROCHLORIDE 2 MILLIGRAM(S): 2 INJECTION INTRAMUSCULAR; INTRAVENOUS; SUBCUTANEOUS at 22:18

## 2022-08-01 RX ADMIN — Medication 3 MILLILITER(S): at 22:18

## 2022-08-01 RX ADMIN — SENNA PLUS 2 TABLET(S): 8.6 TABLET ORAL at 22:18

## 2022-08-01 RX ADMIN — TRAMADOL HYDROCHLORIDE 50 MILLIGRAM(S): 50 TABLET ORAL at 15:30

## 2022-08-01 RX ADMIN — Medication 10 MILLIGRAM(S): at 16:21

## 2022-08-01 RX ADMIN — Medication 40 MILLIGRAM(S): at 05:26

## 2022-08-01 RX ADMIN — TRAMADOL HYDROCHLORIDE 50 MILLIGRAM(S): 50 TABLET ORAL at 14:35

## 2022-08-01 RX ADMIN — Medication 3 MILLILITER(S): at 05:26

## 2022-08-01 RX ADMIN — POLYETHYLENE GLYCOL 3350 17 GRAM(S): 17 POWDER, FOR SOLUTION ORAL at 13:12

## 2022-08-01 RX ADMIN — HYDROMORPHONE HYDROCHLORIDE 2 MILLIGRAM(S): 2 INJECTION INTRAMUSCULAR; INTRAVENOUS; SUBCUTANEOUS at 23:00

## 2022-08-01 RX ADMIN — Medication 0.5 MILLIGRAM(S): at 20:54

## 2022-08-01 RX ADMIN — Medication 3 MILLILITER(S): at 13:12

## 2022-08-01 RX ADMIN — OXYCODONE HYDROCHLORIDE 5 MILLIGRAM(S): 5 TABLET ORAL at 00:34

## 2022-08-01 RX ADMIN — Medication 3 MILLILITER(S): at 17:59

## 2022-08-01 RX ADMIN — OXYCODONE HYDROCHLORIDE 5 MILLIGRAM(S): 5 TABLET ORAL at 05:55

## 2022-08-01 RX ADMIN — OXYCODONE HYDROCHLORIDE 5 MILLIGRAM(S): 5 TABLET ORAL at 05:25

## 2022-08-01 NOTE — DISCHARGE NOTE NURSING/CASE MANAGEMENT/SOCIAL WORK - NSDCPEFALRISK_GEN_ALL_CORE
For information on Fall & Injury Prevention, visit: [email or web address] OR  [email or web address] OR  https://www.cdc.gov/steadi/patient.html

## 2022-08-01 NOTE — PROVIDER CONTACT NOTE (OTHER) - ASSESSMENT
Patient A&Ox4, c/o constipation. Patient states she has not had a bowel movement since Wednesday. Patient is also requesting PO dilaudid for significant pain, refusing Tylenol and not due for tramadol until 2235.
Pt A^OX4. VS as noted. Pt states that she would like tylenol for her abdominal pain to hold her over till her next dose of oxy.
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
## 2022-08-01 NOTE — DISCHARGE NOTE NURSING/CASE MANAGEMENT/SOCIAL WORK - PATIENT PORTAL LINK FT
You can access the FollowMyHealth Patient Portal offered by [facility] by registering at the following website: [email or web address]. By joining [employer]’s FollowMyHealth portal, you will also be able to view your health information using other applications (apps) compatible with our system.

## 2022-08-02 ENCOUNTER — TRANSCRIPTION ENCOUNTER (OUTPATIENT)
Age: 37
End: 2022-08-02

## 2022-08-02 DIAGNOSIS — F41.9 ANXIETY DISORDER, UNSPECIFIED: ICD-10-CM

## 2022-08-02 LAB
ANION GAP SERPL CALC-SCNC: 9 MMOL/L — SIGNIFICANT CHANGE UP (ref 5–17)
BUN SERPL-MCNC: 18 MG/DL — SIGNIFICANT CHANGE UP (ref 7–23)
CALCIUM SERPL-MCNC: 8.7 MG/DL — SIGNIFICANT CHANGE UP (ref 8.4–10.5)
CHLORIDE SERPL-SCNC: 103 MMOL/L — SIGNIFICANT CHANGE UP (ref 96–108)
CO2 SERPL-SCNC: 29 MMOL/L — SIGNIFICANT CHANGE UP (ref 22–31)
CREAT SERPL-MCNC: 0.6 MG/DL — SIGNIFICANT CHANGE UP (ref 0.5–1.3)
EGFR: 119 ML/MIN/1.73M2 — SIGNIFICANT CHANGE UP
GLUCOSE SERPL-MCNC: 103 MG/DL — HIGH (ref 70–99)
HCT VFR BLD CALC: 39 % — SIGNIFICANT CHANGE UP (ref 34.5–45)
HGB BLD-MCNC: 12.7 G/DL — SIGNIFICANT CHANGE UP (ref 11.5–15.5)
MCHC RBC-ENTMCNC: 29.9 PG — SIGNIFICANT CHANGE UP (ref 27–34)
MCHC RBC-ENTMCNC: 32.6 GM/DL — SIGNIFICANT CHANGE UP (ref 32–36)
MCV RBC AUTO: 91.8 FL — SIGNIFICANT CHANGE UP (ref 80–100)
NRBC # BLD: 0 /100 WBCS — SIGNIFICANT CHANGE UP (ref 0–0)
PLATELET # BLD AUTO: 416 K/UL — HIGH (ref 150–400)
POTASSIUM SERPL-MCNC: 3.3 MMOL/L — LOW (ref 3.5–5.3)
POTASSIUM SERPL-SCNC: 3.3 MMOL/L — LOW (ref 3.5–5.3)
RBC # BLD: 4.25 M/UL — SIGNIFICANT CHANGE UP (ref 3.8–5.2)
RBC # FLD: 15 % — HIGH (ref 10.3–14.5)
SODIUM SERPL-SCNC: 141 MMOL/L — SIGNIFICANT CHANGE UP (ref 135–145)
WBC # BLD: 13.09 K/UL — HIGH (ref 3.8–10.5)
WBC # FLD AUTO: 13.09 K/UL — HIGH (ref 3.8–10.5)

## 2022-08-02 PROCEDURE — 99233 SBSQ HOSP IP/OBS HIGH 50: CPT

## 2022-08-02 RX ORDER — ACETAMINOPHEN 500 MG
650 TABLET ORAL EVERY 6 HOURS
Refills: 0 | Status: DISCONTINUED | OUTPATIENT
Start: 2022-08-02 | End: 2022-08-04

## 2022-08-02 RX ORDER — POTASSIUM CHLORIDE 20 MEQ
40 PACKET (EA) ORAL ONCE
Refills: 0 | Status: COMPLETED | OUTPATIENT
Start: 2022-08-02 | End: 2022-08-02

## 2022-08-02 RX ORDER — POLYETHYLENE GLYCOL 3350 17 G/17G
17 POWDER, FOR SOLUTION ORAL
Qty: 0 | Refills: 0 | DISCHARGE
Start: 2022-08-02

## 2022-08-02 RX ORDER — SENNA PLUS 8.6 MG/1
2 TABLET ORAL
Qty: 0 | Refills: 0 | DISCHARGE
Start: 2022-08-02

## 2022-08-02 RX ADMIN — SENNA PLUS 2 TABLET(S): 8.6 TABLET ORAL at 21:19

## 2022-08-02 RX ADMIN — Medication 40 MILLIEQUIVALENT(S): at 18:43

## 2022-08-02 RX ADMIN — POLYETHYLENE GLYCOL 3350 17 GRAM(S): 17 POWDER, FOR SOLUTION ORAL at 06:42

## 2022-08-02 RX ADMIN — POLYETHYLENE GLYCOL 3350 17 GRAM(S): 17 POWDER, FOR SOLUTION ORAL at 18:43

## 2022-08-02 RX ADMIN — Medication 650 MILLIGRAM(S): at 18:52

## 2022-08-02 RX ADMIN — Medication 3 MILLILITER(S): at 13:18

## 2022-08-02 RX ADMIN — TRAMADOL HYDROCHLORIDE 50 MILLIGRAM(S): 50 TABLET ORAL at 11:39

## 2022-08-02 RX ADMIN — Medication 3 MILLILITER(S): at 06:42

## 2022-08-02 RX ADMIN — ENOXAPARIN SODIUM 40 MILLIGRAM(S): 100 INJECTION SUBCUTANEOUS at 13:18

## 2022-08-02 RX ADMIN — Medication 3 MILLILITER(S): at 21:18

## 2022-08-02 RX ADMIN — Medication 3 MILLILITER(S): at 18:43

## 2022-08-02 RX ADMIN — Medication 3 MILLILITER(S): at 01:58

## 2022-08-02 RX ADMIN — Medication 40 MILLIGRAM(S): at 06:43

## 2022-08-02 RX ADMIN — Medication 3 MILLILITER(S): at 09:32

## 2022-08-02 NOTE — DISCHARGE NOTE PROVIDER - CARE PROVIDER_API CALL
Curtis Vann)  Critical Care Medicine; Internal Medicine; Pulmonary Disease  [address] [place] [address]  Phone: [phone]  Fax: [phone]  Follow Up Time:
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
## 2022-08-02 NOTE — DISCHARGE NOTE PROVIDER - NSDCMRMEDTOKEN_GEN_ALL_CORE_FT
Albuterol (Eqv-Proventil HFA) 90 mcg/inh inhalation aerosol: 2 puff(s) inhaled every 6 hours, As Needed  polyethylene glycol 3350 oral powder for reconstitution: 17 gram(s) orally 2 times a day  prednisoLONE 15 mg/5 mL oral syrup: 5 milliliter(s) orally once a day   senna leaf extract oral tablet: 2 tab(s) orally once a day (at bedtime)   Albuterol (Eqv-Proventil HFA) 90 mcg/inh inhalation aerosol: 2 puff(s) inhaled every 6 hours, As Needed  polyethylene glycol 3350 oral powder for reconstitution: 17 gram(s) orally 2 times a day  senna leaf extract oral tablet: 2 tab(s) orally once a day (at bedtime)  traMADol 50 mg oral tablet: 1 tab(s) orally every 6 hours, As needed, Moderate Pain (4 - 6)

## 2022-08-02 NOTE — DISCHARGE NOTE PROVIDER - NSDCFUADDAPPT_GEN_ALL_CORE_FT
APPTS ARE READY TO BE MADE: [ ] YES    Best Family or Patient Contact (if needed):    Additional Information about above appointments (if needed):    1:Pulmonary f/u - outpatient pulmonary follow up upon discharge at [address] [place] [address] ([phone]).  Please e-mail [email or web address] to make an appointment for the patient.  Please include the name, , and a phone number for you and the patient.      2:   3:     Other comments or requests:
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
## 2022-08-02 NOTE — DISCHARGE NOTE PROVIDER - NSDCFUADDINST_GEN_ALL_CORE_FT
Take your medications as directed   Followup with your PMD   Followup with Pulmonary outpatient follow up upon discharge at [address] [place] [address] ([phone]).    Please e-mail [email or web address] to make an appointment for the patient.    Please include the name, , and a phone number for you and the patient

## 2022-08-03 LAB
ANION GAP SERPL CALC-SCNC: 11 MMOL/L — SIGNIFICANT CHANGE UP (ref 5–17)
APPEARANCE UR: CLEAR — SIGNIFICANT CHANGE UP
BILIRUB UR-MCNC: NEGATIVE — SIGNIFICANT CHANGE UP
BUN SERPL-MCNC: 17 MG/DL — SIGNIFICANT CHANGE UP (ref 7–23)
CALCIUM SERPL-MCNC: 8.7 MG/DL — SIGNIFICANT CHANGE UP (ref 8.4–10.5)
CHLORIDE SERPL-SCNC: 103 MMOL/L — SIGNIFICANT CHANGE UP (ref 96–108)
CO2 SERPL-SCNC: 29 MMOL/L — SIGNIFICANT CHANGE UP (ref 22–31)
COLOR SPEC: COLORLESS — SIGNIFICANT CHANGE UP
CREAT SERPL-MCNC: 0.6 MG/DL — SIGNIFICANT CHANGE UP (ref 0.5–1.3)
DIFF PNL FLD: ABNORMAL
EGFR: 119 ML/MIN/1.73M2 — SIGNIFICANT CHANGE UP
GLUCOSE SERPL-MCNC: 104 MG/DL — HIGH (ref 70–99)
GLUCOSE UR QL: NEGATIVE — SIGNIFICANT CHANGE UP
KETONES UR-MCNC: NEGATIVE — SIGNIFICANT CHANGE UP
LEUKOCYTE ESTERASE UR-ACNC: NEGATIVE — SIGNIFICANT CHANGE UP
NITRITE UR-MCNC: NEGATIVE — SIGNIFICANT CHANGE UP
PH UR: 8.5 — HIGH (ref 5–8)
POTASSIUM SERPL-MCNC: 4.2 MMOL/L — SIGNIFICANT CHANGE UP (ref 3.5–5.3)
POTASSIUM SERPL-SCNC: 4.2 MMOL/L — SIGNIFICANT CHANGE UP (ref 3.5–5.3)
PROT UR-MCNC: NEGATIVE — SIGNIFICANT CHANGE UP
SODIUM SERPL-SCNC: 143 MMOL/L — SIGNIFICANT CHANGE UP (ref 135–145)
SP GR SPEC: 1.01 — LOW (ref 1.01–1.02)
UROBILINOGEN FLD QL: NEGATIVE — SIGNIFICANT CHANGE UP

## 2022-08-03 PROCEDURE — 74018 RADEX ABDOMEN 1 VIEW: CPT | Mod: 26

## 2022-08-03 PROCEDURE — 99233 SBSQ HOSP IP/OBS HIGH 50: CPT

## 2022-08-03 PROCEDURE — 74177 CT ABD & PELVIS W/CONTRAST: CPT | Mod: 26

## 2022-08-03 RX ORDER — TRAMADOL HYDROCHLORIDE 50 MG/1
50 TABLET ORAL EVERY 6 HOURS
Refills: 0 | Status: DISCONTINUED | OUTPATIENT
Start: 2022-08-03 | End: 2022-08-04

## 2022-08-03 RX ADMIN — ENOXAPARIN SODIUM 40 MILLIGRAM(S): 100 INJECTION SUBCUTANEOUS at 12:57

## 2022-08-03 RX ADMIN — Medication 650 MILLIGRAM(S): at 13:30

## 2022-08-03 RX ADMIN — Medication 3 MILLILITER(S): at 09:46

## 2022-08-03 RX ADMIN — Medication 3 MILLILITER(S): at 18:39

## 2022-08-03 RX ADMIN — Medication 3 MILLILITER(S): at 22:05

## 2022-08-03 RX ADMIN — TRAMADOL HYDROCHLORIDE 50 MILLIGRAM(S): 50 TABLET ORAL at 22:35

## 2022-08-03 RX ADMIN — Medication 0.5 MILLIGRAM(S): at 22:05

## 2022-08-03 RX ADMIN — Medication 3 MILLILITER(S): at 02:03

## 2022-08-03 RX ADMIN — Medication 650 MILLIGRAM(S): at 12:57

## 2022-08-03 RX ADMIN — Medication 3 MILLILITER(S): at 13:09

## 2022-08-03 RX ADMIN — TRAMADOL HYDROCHLORIDE 50 MILLIGRAM(S): 50 TABLET ORAL at 15:37

## 2022-08-03 RX ADMIN — TRAMADOL HYDROCHLORIDE 50 MILLIGRAM(S): 50 TABLET ORAL at 22:04

## 2022-08-03 RX ADMIN — Medication 650 MILLIGRAM(S): at 00:05

## 2022-08-03 RX ADMIN — TRAMADOL HYDROCHLORIDE 50 MILLIGRAM(S): 50 TABLET ORAL at 17:02

## 2022-08-03 RX ADMIN — Medication 650 MILLIGRAM(S): at 18:38

## 2022-08-03 RX ADMIN — POLYETHYLENE GLYCOL 3350 17 GRAM(S): 17 POWDER, FOR SOLUTION ORAL at 05:34

## 2022-08-03 RX ADMIN — Medication 650 MILLIGRAM(S): at 06:04

## 2022-08-03 RX ADMIN — Medication 3 MILLILITER(S): at 05:34

## 2022-08-03 RX ADMIN — Medication 40 MILLIGRAM(S): at 05:34

## 2022-08-03 RX ADMIN — Medication 650 MILLIGRAM(S): at 05:34

## 2022-08-03 RX ADMIN — Medication 0.5 MILLIGRAM(S): at 15:37

## 2022-08-03 RX ADMIN — Medication 650 MILLIGRAM(S): at 00:35
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
## 2022-08-04 VITALS
OXYGEN SATURATION: 99 % | RESPIRATION RATE: 16 BRPM | TEMPERATURE: 98 F | DIASTOLIC BLOOD PRESSURE: 80 MMHG | SYSTOLIC BLOOD PRESSURE: 130 MMHG | HEART RATE: 83 BPM

## 2022-08-04 LAB
ANION GAP SERPL CALC-SCNC: 8 MMOL/L — SIGNIFICANT CHANGE UP (ref 5–17)
BUN SERPL-MCNC: 17 MG/DL — SIGNIFICANT CHANGE UP (ref 7–23)
CALCIUM SERPL-MCNC: 8.7 MG/DL — SIGNIFICANT CHANGE UP (ref 8.4–10.5)
CHLORIDE SERPL-SCNC: 103 MMOL/L — SIGNIFICANT CHANGE UP (ref 96–108)
CO2 SERPL-SCNC: 27 MMOL/L — SIGNIFICANT CHANGE UP (ref 22–31)
CREAT SERPL-MCNC: 0.58 MG/DL — SIGNIFICANT CHANGE UP (ref 0.5–1.3)
EGFR: 120 ML/MIN/1.73M2 — SIGNIFICANT CHANGE UP
GLUCOSE SERPL-MCNC: 89 MG/DL — SIGNIFICANT CHANGE UP (ref 70–99)
HCT VFR BLD CALC: 41.4 % — SIGNIFICANT CHANGE UP (ref 34.5–45)
HGB BLD-MCNC: 13.6 G/DL — SIGNIFICANT CHANGE UP (ref 11.5–15.5)
MCHC RBC-ENTMCNC: 30.3 PG — SIGNIFICANT CHANGE UP (ref 27–34)
MCHC RBC-ENTMCNC: 32.9 GM/DL — SIGNIFICANT CHANGE UP (ref 32–36)
MCV RBC AUTO: 92.2 FL — SIGNIFICANT CHANGE UP (ref 80–100)
NRBC # BLD: 0 /100 WBCS — SIGNIFICANT CHANGE UP (ref 0–0)
PLATELET # BLD AUTO: 420 K/UL — HIGH (ref 150–400)
POTASSIUM SERPL-MCNC: 4.2 MMOL/L — SIGNIFICANT CHANGE UP (ref 3.5–5.3)
POTASSIUM SERPL-SCNC: 4.2 MMOL/L — SIGNIFICANT CHANGE UP (ref 3.5–5.3)
RBC # BLD: 4.49 M/UL — SIGNIFICANT CHANGE UP (ref 3.8–5.2)
RBC # FLD: 15.2 % — HIGH (ref 10.3–14.5)
SARS-COV-2 RNA SPEC QL NAA+PROBE: SIGNIFICANT CHANGE UP
SODIUM SERPL-SCNC: 138 MMOL/L — SIGNIFICANT CHANGE UP (ref 135–145)
WBC # BLD: 14.15 K/UL — HIGH (ref 3.8–10.5)
WBC # FLD AUTO: 14.15 K/UL — HIGH (ref 3.8–10.5)

## 2022-08-04 PROCEDURE — 83605 ASSAY OF LACTIC ACID: CPT

## 2022-08-04 PROCEDURE — 99253 IP/OBS CNSLTJ NEW/EST LOW 45: CPT

## 2022-08-04 PROCEDURE — 82962 GLUCOSE BLOOD TEST: CPT

## 2022-08-04 PROCEDURE — 36415 COLL VENOUS BLD VENIPUNCTURE: CPT

## 2022-08-04 PROCEDURE — 83735 ASSAY OF MAGNESIUM: CPT

## 2022-08-04 PROCEDURE — 73070 X-RAY EXAM OF ELBOW: CPT

## 2022-08-04 PROCEDURE — 73030 X-RAY EXAM OF SHOULDER: CPT

## 2022-08-04 PROCEDURE — 81001 URINALYSIS AUTO W/SCOPE: CPT

## 2022-08-04 PROCEDURE — 80053 COMPREHEN METABOLIC PANEL: CPT

## 2022-08-04 PROCEDURE — 99285 EMERGENCY DEPT VISIT HI MDM: CPT

## 2022-08-04 PROCEDURE — 0225U NFCT DS DNA&RNA 21 SARSCOV2: CPT

## 2022-08-04 PROCEDURE — 82435 ASSAY OF BLOOD CHLORIDE: CPT

## 2022-08-04 PROCEDURE — 94660 CPAP INITIATION&MGMT: CPT

## 2022-08-04 PROCEDURE — 80048 BASIC METABOLIC PNL TOTAL CA: CPT

## 2022-08-04 PROCEDURE — 94640 AIRWAY INHALATION TREATMENT: CPT

## 2022-08-04 PROCEDURE — 84132 ASSAY OF SERUM POTASSIUM: CPT

## 2022-08-04 PROCEDURE — 71260 CT THORAX DX C+: CPT | Mod: MA

## 2022-08-04 PROCEDURE — 71045 X-RAY EXAM CHEST 1 VIEW: CPT

## 2022-08-04 PROCEDURE — 72125 CT NECK SPINE W/O DYE: CPT | Mod: MA

## 2022-08-04 PROCEDURE — 73110 X-RAY EXAM OF WRIST: CPT

## 2022-08-04 PROCEDURE — 73060 X-RAY EXAM OF HUMERUS: CPT

## 2022-08-04 PROCEDURE — 85027 COMPLETE CBC AUTOMATED: CPT

## 2022-08-04 PROCEDURE — 73090 X-RAY EXAM OF FOREARM: CPT

## 2022-08-04 PROCEDURE — 99232 SBSQ HOSP IP/OBS MODERATE 35: CPT

## 2022-08-04 PROCEDURE — 82330 ASSAY OF CALCIUM: CPT

## 2022-08-04 PROCEDURE — 85014 HEMATOCRIT: CPT

## 2022-08-04 PROCEDURE — 70450 CT HEAD/BRAIN W/O DYE: CPT | Mod: MA

## 2022-08-04 PROCEDURE — 85025 COMPLETE CBC W/AUTO DIFF WBC: CPT

## 2022-08-04 PROCEDURE — 82947 ASSAY GLUCOSE BLOOD QUANT: CPT

## 2022-08-04 PROCEDURE — 96374 THER/PROPH/DIAG INJ IV PUSH: CPT

## 2022-08-04 PROCEDURE — 96375 TX/PRO/DX INJ NEW DRUG ADDON: CPT

## 2022-08-04 PROCEDURE — 85730 THROMBOPLASTIN TIME PARTIAL: CPT

## 2022-08-04 PROCEDURE — U0005: CPT

## 2022-08-04 PROCEDURE — 82803 BLOOD GASES ANY COMBINATION: CPT

## 2022-08-04 PROCEDURE — 74018 RADEX ABDOMEN 1 VIEW: CPT

## 2022-08-04 PROCEDURE — 84295 ASSAY OF SERUM SODIUM: CPT

## 2022-08-04 PROCEDURE — 85018 HEMOGLOBIN: CPT

## 2022-08-04 PROCEDURE — 74177 CT ABD & PELVIS W/CONTRAST: CPT | Mod: MA

## 2022-08-04 PROCEDURE — 84702 CHORIONIC GONADOTROPIN TEST: CPT

## 2022-08-04 PROCEDURE — 84484 ASSAY OF TROPONIN QUANT: CPT

## 2022-08-04 PROCEDURE — U0003: CPT

## 2022-08-04 PROCEDURE — 85610 PROTHROMBIN TIME: CPT

## 2022-08-04 PROCEDURE — 84100 ASSAY OF PHOSPHORUS: CPT

## 2022-08-04 RX ORDER — TRAMADOL HYDROCHLORIDE 50 MG/1
1 TABLET ORAL
Qty: 0 | Refills: 0 | DISCHARGE
Start: 2022-08-04

## 2022-08-04 RX ORDER — PREDNISOLONE 5 MG
5 TABLET ORAL
Qty: 100 | Refills: 0

## 2022-08-04 RX ORDER — TRAMADOL HYDROCHLORIDE 50 MG/1
1 TABLET ORAL
Qty: 20 | Refills: 0
Start: 2022-08-04 | End: 2022-08-08

## 2022-08-04 RX ADMIN — TRAMADOL HYDROCHLORIDE 50 MILLIGRAM(S): 50 TABLET ORAL at 17:23

## 2022-08-04 RX ADMIN — Medication 3 MILLILITER(S): at 05:21

## 2022-08-04 RX ADMIN — Medication 3 MILLILITER(S): at 17:23

## 2022-08-04 RX ADMIN — Medication 650 MILLIGRAM(S): at 05:50

## 2022-08-04 RX ADMIN — Medication 650 MILLIGRAM(S): at 05:20

## 2022-08-04 RX ADMIN — Medication 3 MILLILITER(S): at 13:37

## 2022-08-04 RX ADMIN — TRAMADOL HYDROCHLORIDE 50 MILLIGRAM(S): 50 TABLET ORAL at 17:53

## 2022-08-04 RX ADMIN — Medication 3 MILLILITER(S): at 09:55

## 2022-08-04 RX ADMIN — TRAMADOL HYDROCHLORIDE 50 MILLIGRAM(S): 50 TABLET ORAL at 04:55

## 2022-08-04 RX ADMIN — TRAMADOL HYDROCHLORIDE 50 MILLIGRAM(S): 50 TABLET ORAL at 11:54

## 2022-08-04 RX ADMIN — TRAMADOL HYDROCHLORIDE 50 MILLIGRAM(S): 50 TABLET ORAL at 04:25

## 2022-08-04 RX ADMIN — TRAMADOL HYDROCHLORIDE 50 MILLIGRAM(S): 50 TABLET ORAL at 12:24

## 2022-08-04 RX ADMIN — Medication 40 MILLIGRAM(S): at 05:20

## 2022-08-04 RX ADMIN — Medication 3 MILLILITER(S): at 01:41

## 2022-08-04 RX ADMIN — Medication 650 MILLIGRAM(S): at 02:10

## 2022-08-04 RX ADMIN — Medication 650 MILLIGRAM(S): at 01:40

## 2022-08-04 RX ADMIN — Medication 10 MILLIGRAM(S): at 11:52
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
## 2022-08-04 NOTE — PROVIDER CONTACT NOTE (MEDICATION) - ASSESSMENT
Unable to administer bowel regimen/miralax d/t pending CT abdomen/pelvis for possible obstruction/injury.
Pt recently had CT abdomen/pelvis done for abdominal contusion, abdominal pain, and possible obstruction. Unable to administer senna pending CT scan results.
vss.

## 2022-08-04 NOTE — PROVIDER CONTACT NOTE (MEDICATION) - ACTION/TREATMENT ORDERED:
DEWAYNE Hammond made aware. Orders to not administer senna without CT scan results.
provider to rn that medication can be given early
Jonnie Marshall made aware. No orders to follow.

## 2022-08-04 NOTE — PROGRESS NOTE ADULT - TIME BILLING
Reviewing the EMR, vitals, imaging, medication list, recent labs, prior records and coordinating care with medical providers
Reviewing the EMR, vitals, imaging, medication list, recent labs, prior records and coordinating care with medical providers. Arranging for outpatient follow up.
Reviewing the EMR, vitals, imaging, medication list, recent labs, prior records and coordinating care with medical providers
Reviewing the EMR, vitals, imaging, medication list, recent labs, prior records and coordinating care with medical providers
review of laboratory data, radiology results, discussion with primary team\patient, and monitoring for potential decompensation. Interventions were performed as documented above.
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
## 2022-08-04 NOTE — PROVIDER CONTACT NOTE (MEDICATION) - SITUATION
Unable to administer bowel regimen/miralax d/t pending CT abdomen/pelvis for possible obstruction/injury.
Pt recently had CT abdomen/pelvis done for abdominal contusion, abdominal pain, and possible obstruction. Unable to administer senna pending CT scan results.
pt complaining of 10/10. pain medication due within the hour.

## 2022-08-04 NOTE — ED PROCEDURE NOTE - CPROC ED TIME OUT STATEMENT1
“Patient's name, , procedure and correct site were confirmed during the [place] Timeout.”
“Patient's name, , procedure and correct site were confirmed during the [place] Timeout.”

## 2022-08-04 NOTE — ED PROCEDURE NOTE - PROCEDURE ADDITIONAL DETAILS
Contacted by medicine NP for assistance with suture removal.  Sutures initially placed on July 28th.  Laceration healing well.  No surrounding erythema or discharge.  3 nylon sutures removed.  Steri strips placed.  Patient tolerate procedure well.  -Steve Monroe PA-C
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
## 2022-08-04 NOTE — PROGRESS NOTE ADULT - PROVIDER SPECIALTY LIST ADULT
Internal Medicine
Pulmonology
Internal Medicine
Pulmonology
Internal Medicine
Pulmonology
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
## 2023-11-08 ENCOUNTER — EMERGENCY (EMERGENCY)
Facility: HOSPITAL | Age: 38
LOS: 0 days | Discharge: HOME | End: 2023-11-08
Attending: STUDENT IN AN ORGANIZED HEALTH CARE EDUCATION/TRAINING PROGRAM
Payer: COMMERCIAL

## 2023-11-08 VITALS
TEMPERATURE: 99 F | HEART RATE: 80 BPM | OXYGEN SATURATION: 98 % | RESPIRATION RATE: 16 BRPM | DIASTOLIC BLOOD PRESSURE: 66 MMHG | SYSTOLIC BLOOD PRESSURE: 103 MMHG

## 2023-11-08 VITALS
SYSTOLIC BLOOD PRESSURE: 112 MMHG | TEMPERATURE: 98 F | WEIGHT: 169.98 LBS | HEART RATE: 77 BPM | HEIGHT: 62 IN | RESPIRATION RATE: 18 BRPM | OXYGEN SATURATION: 98 % | DIASTOLIC BLOOD PRESSURE: 78 MMHG

## 2023-11-08 DIAGNOSIS — O03.9 COMPLETE OR UNSPECIFIED SPONTANEOUS ABORTION WITHOUT COMPLICATION: ICD-10-CM

## 2023-11-08 DIAGNOSIS — O20.9 HEMORRHAGE IN EARLY PREGNANCY, UNSPECIFIED: ICD-10-CM

## 2023-11-08 DIAGNOSIS — Z98.82 BREAST IMPLANT STATUS: Chronic | ICD-10-CM

## 2023-11-08 LAB
ALBUMIN SERPL ELPH-MCNC: 3.6 G/DL — SIGNIFICANT CHANGE UP (ref 3.3–5)
ALBUMIN SERPL ELPH-MCNC: 3.6 G/DL — SIGNIFICANT CHANGE UP (ref 3.3–5)
ALP SERPL-CCNC: 68 U/L — SIGNIFICANT CHANGE UP (ref 40–120)
ALP SERPL-CCNC: 68 U/L — SIGNIFICANT CHANGE UP (ref 40–120)
ALT FLD-CCNC: 19 U/L — SIGNIFICANT CHANGE UP (ref 12–78)
ALT FLD-CCNC: 19 U/L — SIGNIFICANT CHANGE UP (ref 12–78)
ANION GAP SERPL CALC-SCNC: 5 MMOL/L — SIGNIFICANT CHANGE UP (ref 5–17)
ANION GAP SERPL CALC-SCNC: 5 MMOL/L — SIGNIFICANT CHANGE UP (ref 5–17)
APPEARANCE UR: ABNORMAL
APPEARANCE UR: ABNORMAL
AST SERPL-CCNC: 12 U/L — LOW (ref 15–37)
AST SERPL-CCNC: 12 U/L — LOW (ref 15–37)
BASOPHILS # BLD AUTO: 0.03 K/UL — SIGNIFICANT CHANGE UP (ref 0–0.2)
BASOPHILS # BLD AUTO: 0.03 K/UL — SIGNIFICANT CHANGE UP (ref 0–0.2)
BASOPHILS NFR BLD AUTO: 0.4 % — SIGNIFICANT CHANGE UP (ref 0–2)
BASOPHILS NFR BLD AUTO: 0.4 % — SIGNIFICANT CHANGE UP (ref 0–2)
BILIRUB SERPL-MCNC: 0.3 MG/DL — SIGNIFICANT CHANGE UP (ref 0.2–1.2)
BILIRUB SERPL-MCNC: 0.3 MG/DL — SIGNIFICANT CHANGE UP (ref 0.2–1.2)
BILIRUB UR-MCNC: NEGATIVE — SIGNIFICANT CHANGE UP
BILIRUB UR-MCNC: NEGATIVE — SIGNIFICANT CHANGE UP
BLD GP AB SCN SERPL QL: SIGNIFICANT CHANGE UP
BLD GP AB SCN SERPL QL: SIGNIFICANT CHANGE UP
BUN SERPL-MCNC: 9 MG/DL — SIGNIFICANT CHANGE UP (ref 7–23)
BUN SERPL-MCNC: 9 MG/DL — SIGNIFICANT CHANGE UP (ref 7–23)
CALCIUM SERPL-MCNC: 8.7 MG/DL — SIGNIFICANT CHANGE UP (ref 8.5–10.1)
CALCIUM SERPL-MCNC: 8.7 MG/DL — SIGNIFICANT CHANGE UP (ref 8.5–10.1)
CHLORIDE SERPL-SCNC: 110 MMOL/L — HIGH (ref 96–108)
CHLORIDE SERPL-SCNC: 110 MMOL/L — HIGH (ref 96–108)
CO2 SERPL-SCNC: 25 MMOL/L — SIGNIFICANT CHANGE UP (ref 22–31)
CO2 SERPL-SCNC: 25 MMOL/L — SIGNIFICANT CHANGE UP (ref 22–31)
COLOR SPEC: YELLOW — SIGNIFICANT CHANGE UP
COLOR SPEC: YELLOW — SIGNIFICANT CHANGE UP
COMMENT - URINE: SIGNIFICANT CHANGE UP
COMMENT - URINE: SIGNIFICANT CHANGE UP
CREAT SERPL-MCNC: 0.54 MG/DL — SIGNIFICANT CHANGE UP (ref 0.5–1.3)
CREAT SERPL-MCNC: 0.54 MG/DL — SIGNIFICANT CHANGE UP (ref 0.5–1.3)
DIFF PNL FLD: ABNORMAL
DIFF PNL FLD: ABNORMAL
EGFR: 122 ML/MIN/1.73M2 — SIGNIFICANT CHANGE UP
EGFR: 122 ML/MIN/1.73M2 — SIGNIFICANT CHANGE UP
EOSINOPHIL # BLD AUTO: 0.21 K/UL — SIGNIFICANT CHANGE UP (ref 0–0.5)
EOSINOPHIL # BLD AUTO: 0.21 K/UL — SIGNIFICANT CHANGE UP (ref 0–0.5)
EOSINOPHIL NFR BLD AUTO: 2.6 % — SIGNIFICANT CHANGE UP (ref 0–6)
EOSINOPHIL NFR BLD AUTO: 2.6 % — SIGNIFICANT CHANGE UP (ref 0–6)
GLUCOSE SERPL-MCNC: 88 MG/DL — SIGNIFICANT CHANGE UP (ref 70–99)
GLUCOSE SERPL-MCNC: 88 MG/DL — SIGNIFICANT CHANGE UP (ref 70–99)
GLUCOSE UR QL: NEGATIVE MG/DL — SIGNIFICANT CHANGE UP
GLUCOSE UR QL: NEGATIVE MG/DL — SIGNIFICANT CHANGE UP
GRAN CASTS # UR COMP ASSIST: SIGNIFICANT CHANGE UP
GRAN CASTS # UR COMP ASSIST: SIGNIFICANT CHANGE UP
HCG SERPL-ACNC: HIGH MIU/ML
HCG SERPL-ACNC: HIGH MIU/ML
HCT VFR BLD CALC: 39.1 % — SIGNIFICANT CHANGE UP (ref 34.5–45)
HCT VFR BLD CALC: 39.1 % — SIGNIFICANT CHANGE UP (ref 34.5–45)
HGB BLD-MCNC: 13.1 G/DL — SIGNIFICANT CHANGE UP (ref 11.5–15.5)
HGB BLD-MCNC: 13.1 G/DL — SIGNIFICANT CHANGE UP (ref 11.5–15.5)
IMM GRANULOCYTES NFR BLD AUTO: 0.2 % — SIGNIFICANT CHANGE UP (ref 0–0.9)
IMM GRANULOCYTES NFR BLD AUTO: 0.2 % — SIGNIFICANT CHANGE UP (ref 0–0.9)
KETONES UR-MCNC: NEGATIVE MG/DL — SIGNIFICANT CHANGE UP
KETONES UR-MCNC: NEGATIVE MG/DL — SIGNIFICANT CHANGE UP
LEUKOCYTE ESTERASE UR-ACNC: NEGATIVE — SIGNIFICANT CHANGE UP
LEUKOCYTE ESTERASE UR-ACNC: NEGATIVE — SIGNIFICANT CHANGE UP
LYMPHOCYTES # BLD AUTO: 2.57 K/UL — SIGNIFICANT CHANGE UP (ref 1–3.3)
LYMPHOCYTES # BLD AUTO: 2.57 K/UL — SIGNIFICANT CHANGE UP (ref 1–3.3)
LYMPHOCYTES # BLD AUTO: 31.4 % — SIGNIFICANT CHANGE UP (ref 13–44)
LYMPHOCYTES # BLD AUTO: 31.4 % — SIGNIFICANT CHANGE UP (ref 13–44)
MCHC RBC-ENTMCNC: 29.9 PG — SIGNIFICANT CHANGE UP (ref 27–34)
MCHC RBC-ENTMCNC: 29.9 PG — SIGNIFICANT CHANGE UP (ref 27–34)
MCHC RBC-ENTMCNC: 33.5 G/DL — SIGNIFICANT CHANGE UP (ref 32–36)
MCHC RBC-ENTMCNC: 33.5 G/DL — SIGNIFICANT CHANGE UP (ref 32–36)
MCV RBC AUTO: 89.3 FL — SIGNIFICANT CHANGE UP (ref 80–100)
MCV RBC AUTO: 89.3 FL — SIGNIFICANT CHANGE UP (ref 80–100)
MONOCYTES # BLD AUTO: 0.63 K/UL — SIGNIFICANT CHANGE UP (ref 0–0.9)
MONOCYTES # BLD AUTO: 0.63 K/UL — SIGNIFICANT CHANGE UP (ref 0–0.9)
MONOCYTES NFR BLD AUTO: 7.7 % — SIGNIFICANT CHANGE UP (ref 2–14)
MONOCYTES NFR BLD AUTO: 7.7 % — SIGNIFICANT CHANGE UP (ref 2–14)
NEUTROPHILS # BLD AUTO: 4.72 K/UL — SIGNIFICANT CHANGE UP (ref 1.8–7.4)
NEUTROPHILS # BLD AUTO: 4.72 K/UL — SIGNIFICANT CHANGE UP (ref 1.8–7.4)
NEUTROPHILS NFR BLD AUTO: 57.7 % — SIGNIFICANT CHANGE UP (ref 43–77)
NEUTROPHILS NFR BLD AUTO: 57.7 % — SIGNIFICANT CHANGE UP (ref 43–77)
NITRITE UR-MCNC: NEGATIVE — SIGNIFICANT CHANGE UP
NITRITE UR-MCNC: NEGATIVE — SIGNIFICANT CHANGE UP
NRBC # BLD: 0 /100 WBCS — SIGNIFICANT CHANGE UP (ref 0–0)
NRBC # BLD: 0 /100 WBCS — SIGNIFICANT CHANGE UP (ref 0–0)
PH UR: 6.5 — SIGNIFICANT CHANGE UP (ref 5–8)
PH UR: 6.5 — SIGNIFICANT CHANGE UP (ref 5–8)
PLATELET # BLD AUTO: 403 K/UL — HIGH (ref 150–400)
PLATELET # BLD AUTO: 403 K/UL — HIGH (ref 150–400)
POTASSIUM SERPL-MCNC: 3.7 MMOL/L — SIGNIFICANT CHANGE UP (ref 3.5–5.3)
POTASSIUM SERPL-MCNC: 3.7 MMOL/L — SIGNIFICANT CHANGE UP (ref 3.5–5.3)
POTASSIUM SERPL-SCNC: 3.7 MMOL/L — SIGNIFICANT CHANGE UP (ref 3.5–5.3)
POTASSIUM SERPL-SCNC: 3.7 MMOL/L — SIGNIFICANT CHANGE UP (ref 3.5–5.3)
PROT SERPL-MCNC: 7.8 GM/DL — SIGNIFICANT CHANGE UP (ref 6–8.3)
PROT SERPL-MCNC: 7.8 GM/DL — SIGNIFICANT CHANGE UP (ref 6–8.3)
PROT UR-MCNC: NEGATIVE MG/DL — SIGNIFICANT CHANGE UP
PROT UR-MCNC: NEGATIVE MG/DL — SIGNIFICANT CHANGE UP
RBC # BLD: 4.38 M/UL — SIGNIFICANT CHANGE UP (ref 3.8–5.2)
RBC # BLD: 4.38 M/UL — SIGNIFICANT CHANGE UP (ref 3.8–5.2)
RBC # FLD: 12.6 % — SIGNIFICANT CHANGE UP (ref 10.3–14.5)
RBC # FLD: 12.6 % — SIGNIFICANT CHANGE UP (ref 10.3–14.5)
RBC CASTS # UR COMP ASSIST: NEGATIVE /HPF — SIGNIFICANT CHANGE UP (ref 0–4)
RBC CASTS # UR COMP ASSIST: NEGATIVE /HPF — SIGNIFICANT CHANGE UP (ref 0–4)
SODIUM SERPL-SCNC: 140 MMOL/L — SIGNIFICANT CHANGE UP (ref 135–145)
SODIUM SERPL-SCNC: 140 MMOL/L — SIGNIFICANT CHANGE UP (ref 135–145)
SP GR SPEC: 1.01 — SIGNIFICANT CHANGE UP (ref 1–1.03)
SP GR SPEC: 1.01 — SIGNIFICANT CHANGE UP (ref 1–1.03)
UROBILINOGEN FLD QL: 0.2 MG/DL — SIGNIFICANT CHANGE UP (ref 0.2–1)
UROBILINOGEN FLD QL: 0.2 MG/DL — SIGNIFICANT CHANGE UP (ref 0.2–1)
WBC # BLD: 8.18 K/UL — SIGNIFICANT CHANGE UP (ref 3.8–10.5)
WBC # BLD: 8.18 K/UL — SIGNIFICANT CHANGE UP (ref 3.8–10.5)
WBC # FLD AUTO: 8.18 K/UL — SIGNIFICANT CHANGE UP (ref 3.8–10.5)
WBC # FLD AUTO: 8.18 K/UL — SIGNIFICANT CHANGE UP (ref 3.8–10.5)
WBC UR QL: NEGATIVE /HPF — SIGNIFICANT CHANGE UP (ref 0–5)
WBC UR QL: NEGATIVE /HPF — SIGNIFICANT CHANGE UP (ref 0–5)

## 2023-11-08 PROCEDURE — 76856 US EXAM PELVIC COMPLETE: CPT | Mod: 26

## 2023-11-08 PROCEDURE — 99284 EMERGENCY DEPT VISIT MOD MDM: CPT

## 2023-11-08 RX ORDER — SODIUM CHLORIDE 9 MG/ML
1000 INJECTION INTRAMUSCULAR; INTRAVENOUS; SUBCUTANEOUS ONCE
Refills: 0 | Status: COMPLETED | OUTPATIENT
Start: 2023-11-08 | End: 2023-11-08

## 2023-11-08 RX ORDER — ACETAMINOPHEN 500 MG
1000 TABLET ORAL ONCE
Refills: 0 | Status: COMPLETED | OUTPATIENT
Start: 2023-11-08 | End: 2023-11-08

## 2023-11-08 RX ADMIN — SODIUM CHLORIDE 1000 MILLILITER(S): 9 INJECTION INTRAMUSCULAR; INTRAVENOUS; SUBCUTANEOUS at 11:16

## 2023-11-08 RX ADMIN — Medication 400 MILLIGRAM(S): at 16:34

## 2023-11-08 NOTE — ED ADULT NURSE NOTE - NSFALLUNIVINTERV_ED_ALL_ED
Bed/Stretcher in lowest position, wheels locked, appropriate side rails in place/Call bell, personal items and telephone in reach/Instruct patient to call for assistance before getting out of bed/chair/stretcher/Non-slip footwear applied when patient is off stretcher/[place] to call system/Physically safe environment - no spills, clutter or unnecessary equipment/Purposeful proactive rounding/Room/bathroom lighting operational, light cord in reach

## 2023-11-08 NOTE — ED PROVIDER NOTE - OBJECTIVE STATEMENT
brownish dc on monday w lower abdominal cramping pain, soaked two panyliners  improved on tuesday  today red clots and lower abdominal cramping 38 year old female  presents (LMP 23) today c/o being approximately 8 weeks pregnant (w an IUD in place, removed in September after pregnancy was confirmed), reports having vaginal bleeding (brownish discharge) and lower abdominal pain first noted on Monday, pt reports soaking through two pantyliners, today red clots seen with continues lower abdominal cramping pain (-) nausea or vomiting

## 2023-11-08 NOTE — ED PROVIDER NOTE - PATIENT PORTAL LINK FT
Patient does not appear to be in any acute distress/shows no evidence of clinical instability at this time. Provider has reviewed discharge instructions with the patient/family. The patient/family verbalized understanding instructions as well as need for follow up for any further symptoms. Discharge papers given, education provided, and any questions answered. You can access the FollowMyHealth Patient Portal offered by [facility] by registering at the following website: [email or web address]. By joining [facility]’s FollowMyHealth portal, you will also be able to view your health information using other applications (apps) compatible with our system.

## 2023-11-08 NOTE — ED ADULT NURSE NOTE - NS PRO AD NO ADVANCE DIRECTIVE
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 Found to be fluid overloaded.  Now s/p multiple hemodialysis treatments.  Hospital course complicated by new onset atrial fibrillation with rapid ventricular response. Now transitioned to oral Cardizem and low dose beta blocker for rate control and heparin for anticoagulation. Coumadin on hold for upcoming vascular surgery (AVFistula).  Given stable blood pressures ( no evidence of hypotension during or after dialysis) and heart rates (no evidence of bradycardia), would opt to increase metoprolol to 50mg bid.   . Can consider an antiarrhythmic if the AFib with RVR persists when the pericardial effusion resolves. No

## 2023-11-08 NOTE — ED ADULT TRIAGE NOTE - AS HEIGHT TYPE
38 yo M first response from physical therapy. Hx of seizures, pt said his shaking/tremors are normal at all times, but are in creased and more strongly today. Pt is tremoring all over. Aaox4. Diaphoretic, 99.1 oral temp. C/o pain all over.   
stated

## 2023-11-08 NOTE — ED PROVIDER NOTE - NSFOLLOWUPINSTRUCTIONS_ED_ALL_ED_FT
pt able to call her ob, has an appointment tmr, will get setup for d and c I have discussed the discharge plan with the patient. The patient agrees with the plan, as discussed.  The patient understands Emergency Department diagnosis is a preliminary diagnosis often based on limited information and that the patient must adhere to the follow-up plan as discussed.  The patient understands that if the symptoms worsen or if prescribed medications do not have the desired/planned effect that the patient may return to the Emergency Department at any time for further evaluation and treatment.

## 2023-11-08 NOTE — ED ADULT NURSE NOTE - OBJECTIVE STATEMENT
pt a&O x4 , pt c.o Intermittent right lower abdominal cramping with spotting 3 days ago and moderate bleeding today , 8 weeks pregnant with  LMP 2023. Added she got pregnant with IUD in place but was removed later in September after confirmed pregnancy. has sono 10/24

## 2023-11-08 NOTE — ED PROVIDER NOTE - CLINICAL SUMMARY MEDICAL DECISION MAKING FREE TEXT BOX
38 year old female  presents (LMP 23) today c/o being approximately 8 weeks pregnant (w an IUD in place, removed in September after pregnancy was confirmed), reports having vaginal bleeding (brownish discharge) and lower abdominal pain first noted on Monday, pt reports soaking through two pantyliners, today red clots seen with continues lower abdominal cramping pain (-) nausea or vomiting, exam benign, will order labs, hydrate, medicate for pain PRN and order sono to rule out miscarriage, early pregnancy or ectopic pregnancy    labs reviewed, pts hormone levels decreasing (compared to pts prior results), no fetal heart rate noted on exam, likely miscarrying, pt given her results, she was able to make an appointment to see her ob tomorrow

## 2023-11-08 NOTE — ED ADULT TRIAGE NOTE - CHIEF COMPLAINT QUOTE
Intermittent right lower abdominal cramping with spotting 3 days ago and moderate bleeding today , 8 weeks pregnant with  LMP 9/2/2023. Added she got pregnant with IUD in place but was removed later in September after confirmed pregnancy.

## 2023-11-18 ENCOUNTER — EMERGENCY (EMERGENCY)
Facility: HOSPITAL | Age: 38
LOS: 0 days | Discharge: HOME | End: 2023-11-18
Attending: EMERGENCY MEDICINE
Payer: COMMERCIAL

## 2023-11-18 VITALS
TEMPERATURE: 98 F | SYSTOLIC BLOOD PRESSURE: 171 MMHG | DIASTOLIC BLOOD PRESSURE: 114 MMHG | RESPIRATION RATE: 17 BRPM | WEIGHT: 169.98 LBS | HEIGHT: 62 IN | HEART RATE: 90 BPM | OXYGEN SATURATION: 95 %

## 2023-11-18 VITALS
DIASTOLIC BLOOD PRESSURE: 72 MMHG | HEART RATE: 75 BPM | TEMPERATURE: 98 F | SYSTOLIC BLOOD PRESSURE: 119 MMHG | OXYGEN SATURATION: 98 % | RESPIRATION RATE: 16 BRPM

## 2023-11-18 DIAGNOSIS — O03.6 DELAYED OR EXCESSIVE HEMORRHAGE FOLLOWING COMPLETE OR UNSPECIFIED SPONTANEOUS ABORTION: ICD-10-CM

## 2023-11-18 DIAGNOSIS — J45.909 UNSPECIFIED ASTHMA, UNCOMPLICATED: ICD-10-CM

## 2023-11-18 DIAGNOSIS — Z98.82 BREAST IMPLANT STATUS: Chronic | ICD-10-CM

## 2023-11-18 DIAGNOSIS — L40.9 PSORIASIS, UNSPECIFIED: ICD-10-CM

## 2023-11-18 LAB
ALBUMIN SERPL ELPH-MCNC: 3.4 G/DL — SIGNIFICANT CHANGE UP (ref 3.3–5)
ALBUMIN SERPL ELPH-MCNC: 3.4 G/DL — SIGNIFICANT CHANGE UP (ref 3.3–5)
ALP SERPL-CCNC: 62 U/L — SIGNIFICANT CHANGE UP (ref 40–120)
ALP SERPL-CCNC: 62 U/L — SIGNIFICANT CHANGE UP (ref 40–120)
ALT FLD-CCNC: 18 U/L — SIGNIFICANT CHANGE UP (ref 12–78)
ALT FLD-CCNC: 18 U/L — SIGNIFICANT CHANGE UP (ref 12–78)
ANION GAP SERPL CALC-SCNC: 5 MMOL/L — SIGNIFICANT CHANGE UP (ref 5–17)
ANION GAP SERPL CALC-SCNC: 5 MMOL/L — SIGNIFICANT CHANGE UP (ref 5–17)
APPEARANCE UR: ABNORMAL
APPEARANCE UR: ABNORMAL
AST SERPL-CCNC: 19 U/L — SIGNIFICANT CHANGE UP (ref 15–37)
AST SERPL-CCNC: 19 U/L — SIGNIFICANT CHANGE UP (ref 15–37)
BACTERIA # UR AUTO: ABNORMAL /HPF
BACTERIA # UR AUTO: ABNORMAL /HPF
BASOPHILS # BLD AUTO: 0.03 K/UL — SIGNIFICANT CHANGE UP (ref 0–0.2)
BASOPHILS # BLD AUTO: 0.03 K/UL — SIGNIFICANT CHANGE UP (ref 0–0.2)
BASOPHILS NFR BLD AUTO: 0.2 % — SIGNIFICANT CHANGE UP (ref 0–2)
BASOPHILS NFR BLD AUTO: 0.2 % — SIGNIFICANT CHANGE UP (ref 0–2)
BILIRUB SERPL-MCNC: 0.3 MG/DL — SIGNIFICANT CHANGE UP (ref 0.2–1.2)
BILIRUB SERPL-MCNC: 0.3 MG/DL — SIGNIFICANT CHANGE UP (ref 0.2–1.2)
BILIRUB UR-MCNC: NEGATIVE — SIGNIFICANT CHANGE UP
BILIRUB UR-MCNC: NEGATIVE — SIGNIFICANT CHANGE UP
BLD GP AB SCN SERPL QL: SIGNIFICANT CHANGE UP
BLD GP AB SCN SERPL QL: SIGNIFICANT CHANGE UP
BUN SERPL-MCNC: 13 MG/DL — SIGNIFICANT CHANGE UP (ref 7–23)
BUN SERPL-MCNC: 13 MG/DL — SIGNIFICANT CHANGE UP (ref 7–23)
CALCIUM SERPL-MCNC: 8.6 MG/DL — SIGNIFICANT CHANGE UP (ref 8.5–10.1)
CALCIUM SERPL-MCNC: 8.6 MG/DL — SIGNIFICANT CHANGE UP (ref 8.5–10.1)
CHLORIDE SERPL-SCNC: 110 MMOL/L — HIGH (ref 96–108)
CHLORIDE SERPL-SCNC: 110 MMOL/L — HIGH (ref 96–108)
CO2 SERPL-SCNC: 27 MMOL/L — SIGNIFICANT CHANGE UP (ref 22–31)
CO2 SERPL-SCNC: 27 MMOL/L — SIGNIFICANT CHANGE UP (ref 22–31)
COLOR SPEC: ABNORMAL
COLOR SPEC: ABNORMAL
CREAT SERPL-MCNC: 0.61 MG/DL — SIGNIFICANT CHANGE UP (ref 0.5–1.3)
CREAT SERPL-MCNC: 0.61 MG/DL — SIGNIFICANT CHANGE UP (ref 0.5–1.3)
DIFF PNL FLD: ABNORMAL
DIFF PNL FLD: ABNORMAL
EGFR: 118 ML/MIN/1.73M2 — SIGNIFICANT CHANGE UP
EGFR: 118 ML/MIN/1.73M2 — SIGNIFICANT CHANGE UP
EOSINOPHIL # BLD AUTO: 0.29 K/UL — SIGNIFICANT CHANGE UP (ref 0–0.5)
EOSINOPHIL # BLD AUTO: 0.29 K/UL — SIGNIFICANT CHANGE UP (ref 0–0.5)
EOSINOPHIL NFR BLD AUTO: 2.2 % — SIGNIFICANT CHANGE UP (ref 0–6)
EOSINOPHIL NFR BLD AUTO: 2.2 % — SIGNIFICANT CHANGE UP (ref 0–6)
EPI CELLS # UR: PRESENT
EPI CELLS # UR: PRESENT
GLUCOSE SERPL-MCNC: 106 MG/DL — HIGH (ref 70–99)
GLUCOSE SERPL-MCNC: 106 MG/DL — HIGH (ref 70–99)
GLUCOSE UR QL: NEGATIVE MG/DL — SIGNIFICANT CHANGE UP
GLUCOSE UR QL: NEGATIVE MG/DL — SIGNIFICANT CHANGE UP
HCG SERPL-ACNC: 5357 MIU/ML — HIGH
HCG SERPL-ACNC: 5357 MIU/ML — HIGH
HCT VFR BLD CALC: 36 % — SIGNIFICANT CHANGE UP (ref 34.5–45)
HCT VFR BLD CALC: 36 % — SIGNIFICANT CHANGE UP (ref 34.5–45)
HGB BLD-MCNC: 12.5 G/DL — SIGNIFICANT CHANGE UP (ref 11.5–15.5)
HGB BLD-MCNC: 12.5 G/DL — SIGNIFICANT CHANGE UP (ref 11.5–15.5)
IMM GRANULOCYTES NFR BLD AUTO: 0.2 % — SIGNIFICANT CHANGE UP (ref 0–0.9)
IMM GRANULOCYTES NFR BLD AUTO: 0.2 % — SIGNIFICANT CHANGE UP (ref 0–0.9)
KETONES UR-MCNC: NEGATIVE MG/DL — SIGNIFICANT CHANGE UP
KETONES UR-MCNC: NEGATIVE MG/DL — SIGNIFICANT CHANGE UP
LEUKOCYTE ESTERASE UR-ACNC: ABNORMAL
LEUKOCYTE ESTERASE UR-ACNC: ABNORMAL
LYMPHOCYTES # BLD AUTO: 29.1 % — SIGNIFICANT CHANGE UP (ref 13–44)
LYMPHOCYTES # BLD AUTO: 29.1 % — SIGNIFICANT CHANGE UP (ref 13–44)
LYMPHOCYTES # BLD AUTO: 3.84 K/UL — HIGH (ref 1–3.3)
LYMPHOCYTES # BLD AUTO: 3.84 K/UL — HIGH (ref 1–3.3)
MCHC RBC-ENTMCNC: 31 PG — SIGNIFICANT CHANGE UP (ref 27–34)
MCHC RBC-ENTMCNC: 31 PG — SIGNIFICANT CHANGE UP (ref 27–34)
MCHC RBC-ENTMCNC: 34.7 G/DL — SIGNIFICANT CHANGE UP (ref 32–36)
MCHC RBC-ENTMCNC: 34.7 G/DL — SIGNIFICANT CHANGE UP (ref 32–36)
MCV RBC AUTO: 89.3 FL — SIGNIFICANT CHANGE UP (ref 80–100)
MCV RBC AUTO: 89.3 FL — SIGNIFICANT CHANGE UP (ref 80–100)
MONOCYTES # BLD AUTO: 0.93 K/UL — HIGH (ref 0–0.9)
MONOCYTES # BLD AUTO: 0.93 K/UL — HIGH (ref 0–0.9)
MONOCYTES NFR BLD AUTO: 7 % — SIGNIFICANT CHANGE UP (ref 2–14)
MONOCYTES NFR BLD AUTO: 7 % — SIGNIFICANT CHANGE UP (ref 2–14)
NEUTROPHILS # BLD AUTO: 8.08 K/UL — HIGH (ref 1.8–7.4)
NEUTROPHILS # BLD AUTO: 8.08 K/UL — HIGH (ref 1.8–7.4)
NEUTROPHILS NFR BLD AUTO: 61.3 % — SIGNIFICANT CHANGE UP (ref 43–77)
NEUTROPHILS NFR BLD AUTO: 61.3 % — SIGNIFICANT CHANGE UP (ref 43–77)
NITRITE UR-MCNC: NEGATIVE — SIGNIFICANT CHANGE UP
NITRITE UR-MCNC: NEGATIVE — SIGNIFICANT CHANGE UP
NRBC # BLD: 0 /100 WBCS — SIGNIFICANT CHANGE UP (ref 0–0)
NRBC # BLD: 0 /100 WBCS — SIGNIFICANT CHANGE UP (ref 0–0)
PH UR: 5.5 — SIGNIFICANT CHANGE UP (ref 5–8)
PH UR: 5.5 — SIGNIFICANT CHANGE UP (ref 5–8)
PLATELET # BLD AUTO: 351 K/UL — SIGNIFICANT CHANGE UP (ref 150–400)
PLATELET # BLD AUTO: 351 K/UL — SIGNIFICANT CHANGE UP (ref 150–400)
POTASSIUM SERPL-MCNC: 3.9 MMOL/L — SIGNIFICANT CHANGE UP (ref 3.5–5.3)
POTASSIUM SERPL-MCNC: 3.9 MMOL/L — SIGNIFICANT CHANGE UP (ref 3.5–5.3)
POTASSIUM SERPL-SCNC: 3.9 MMOL/L — SIGNIFICANT CHANGE UP (ref 3.5–5.3)
POTASSIUM SERPL-SCNC: 3.9 MMOL/L — SIGNIFICANT CHANGE UP (ref 3.5–5.3)
PROT SERPL-MCNC: 7.4 GM/DL — SIGNIFICANT CHANGE UP (ref 6–8.3)
PROT SERPL-MCNC: 7.4 GM/DL — SIGNIFICANT CHANGE UP (ref 6–8.3)
PROT UR-MCNC: 30 MG/DL
PROT UR-MCNC: 30 MG/DL
RBC # BLD: 4.03 M/UL — SIGNIFICANT CHANGE UP (ref 3.8–5.2)
RBC # BLD: 4.03 M/UL — SIGNIFICANT CHANGE UP (ref 3.8–5.2)
RBC # FLD: 12.8 % — SIGNIFICANT CHANGE UP (ref 10.3–14.5)
RBC # FLD: 12.8 % — SIGNIFICANT CHANGE UP (ref 10.3–14.5)
RBC CASTS # UR COMP ASSIST: >50 /HPF — HIGH (ref 0–4)
RBC CASTS # UR COMP ASSIST: >50 /HPF — HIGH (ref 0–4)
SODIUM SERPL-SCNC: 142 MMOL/L — SIGNIFICANT CHANGE UP (ref 135–145)
SODIUM SERPL-SCNC: 142 MMOL/L — SIGNIFICANT CHANGE UP (ref 135–145)
SP GR SPEC: 1.03 — SIGNIFICANT CHANGE UP (ref 1–1.03)
SP GR SPEC: 1.03 — SIGNIFICANT CHANGE UP (ref 1–1.03)
UROBILINOGEN FLD QL: 0.2 MG/DL — SIGNIFICANT CHANGE UP (ref 0.2–1)
UROBILINOGEN FLD QL: 0.2 MG/DL — SIGNIFICANT CHANGE UP (ref 0.2–1)
WBC # BLD: 13.2 K/UL — HIGH (ref 3.8–10.5)
WBC # BLD: 13.2 K/UL — HIGH (ref 3.8–10.5)
WBC # FLD AUTO: 13.2 K/UL — HIGH (ref 3.8–10.5)
WBC # FLD AUTO: 13.2 K/UL — HIGH (ref 3.8–10.5)
WBC UR QL: 2 /HPF — SIGNIFICANT CHANGE UP (ref 0–5)
WBC UR QL: 2 /HPF — SIGNIFICANT CHANGE UP (ref 0–5)

## 2023-11-18 PROCEDURE — 76856 US EXAM PELVIC COMPLETE: CPT | Mod: 26

## 2023-11-18 PROCEDURE — 99284 EMERGENCY DEPT VISIT MOD MDM: CPT

## 2023-11-18 RX ORDER — IBUPROFEN 200 MG
1 TABLET ORAL
Qty: 15 | Refills: 0
Start: 2023-11-18 | End: 2023-11-22

## 2023-11-18 RX ORDER — TRAMADOL HYDROCHLORIDE 50 MG/1
1 TABLET ORAL
Qty: 6 | Refills: 0
Start: 2023-11-18 | End: 2023-11-19

## 2023-11-18 RX ORDER — SODIUM CHLORIDE 9 MG/ML
1000 INJECTION INTRAMUSCULAR; INTRAVENOUS; SUBCUTANEOUS ONCE
Refills: 0 | Status: COMPLETED | OUTPATIENT
Start: 2023-11-18 | End: 2023-11-18

## 2023-11-18 RX ORDER — ACETAMINOPHEN 500 MG
975 TABLET ORAL ONCE
Refills: 0 | Status: COMPLETED | OUTPATIENT
Start: 2023-11-18 | End: 2023-11-18

## 2023-11-18 RX ORDER — TRAMADOL HYDROCHLORIDE 50 MG/1
1 TABLET ORAL
Qty: 15 | Refills: 0
Start: 2023-11-18 | End: 2023-11-22

## 2023-11-18 RX ORDER — KETOROLAC TROMETHAMINE 30 MG/ML
30 SYRINGE (ML) INJECTION ONCE
Refills: 0 | Status: DISCONTINUED | OUTPATIENT
Start: 2023-11-18 | End: 2023-11-18

## 2023-11-18 RX ADMIN — Medication 30 MILLIGRAM(S): at 02:28

## 2023-11-18 RX ADMIN — SODIUM CHLORIDE 1000 MILLILITER(S): 9 INJECTION INTRAMUSCULAR; INTRAVENOUS; SUBCUTANEOUS at 02:28

## 2023-11-18 NOTE — ED ADULT NURSE NOTE - NSFALLUNIVINTERV_ED_ALL_ED
Bed/Stretcher in lowest position, wheels locked, appropriate side rails in place/Call bell, personal items and telephone in reach/Instruct patient to call for assistance before getting out of bed/chair/stretcher/Non-slip footwear applied when patient is off stretcher/[place] to call system/Physically safe environment - no spills, clutter or unnecessary equipment/Purposeful proactive rounding/Room/bathroom lighting operational, light cord in reach

## 2023-11-18 NOTE — ED PROVIDER NOTE - CLINICAL SUMMARY MEDICAL DECISION MAKING FREE TEXT BOX
vaginal bleeding. miscarriage completing likely. vaginal bleeding. miscarriage completing likely. sono reassuring. hgb and labs and urine reassuring. pain control. ok for dc home

## 2023-11-18 NOTE — ED PROVIDER NOTE - PATIENT PORTAL LINK FT
You can access the FollowMyHealth Patient Portal offered by [facility] by registering at the following website: [email or web address]. By joining [facility]’s FollowMyHealth portal, you will also be able to view your health information using other applications (apps) compatible with our system.

## 2023-11-18 NOTE — ED ADULT TRIAGE NOTE - CHIEF COMPLAINT QUOTE
miscarrying , passed embryo on Monday , stated vaginal bleeding since 8PM , intermittent lower ABD pain, continue passing products of fetus. reports sever pain.

## 2023-11-18 NOTE — ED ADULT NURSE NOTE - OBJECTIVE STATEMENT
Pt presents to ED c/o of severe pain and vaginal bleeding since 8PM. Pt states she miscarried Monday and is having severe intermittent lower abdominal pain and passing clots. Pt rates pain 10/10, has been taking ibuprofen for pain. LMP. Safety maintained.

## 2023-11-18 NOTE — ED PROVIDER NOTE - NSFOLLOWUPINSTRUCTIONS_ED_ALL_ED_FT
Take IBUPROFEN as needed for moderate pain.    Add TRAMADOL if the pain becomes more severe.      Follow up with your primary obstetrician.

## 2023-11-18 NOTE — ED POST DISCHARGE NOTE - ADDITIONAL DOCUMENTATION
Patient called stating that her Tramadol script was not sent to the pharmacy, reviewed EMR, pt was prescribed Tramadol 50 mg by DR. Sorto but script appeared un-submitted upon further review, will send script to pt's pharmacy as prescribed by Dr. Sorto.

## 2023-11-18 NOTE — ED ADULT TRIAGE NOTE - BIRTH SEX
Quality 110: Preventive Care And Screening: Influenza Immunization: Influenza Immunization Administered during Influenza season Detail Level: Detailed Female

## 2024-03-05 ENCOUNTER — NON-APPOINTMENT (OUTPATIENT)
Age: 39
End: 2024-03-05

## 2024-03-06 ENCOUNTER — EMERGENCY (EMERGENCY)
Facility: HOSPITAL | Age: 39
LOS: 0 days | Discharge: HOME | End: 2024-03-06
Attending: STUDENT IN AN ORGANIZED HEALTH CARE EDUCATION/TRAINING PROGRAM
Payer: COMMERCIAL

## 2024-03-06 VITALS
HEIGHT: 62 IN | RESPIRATION RATE: 20 BRPM | WEIGHT: 108.03 LBS | TEMPERATURE: 98 F | OXYGEN SATURATION: 100 % | DIASTOLIC BLOOD PRESSURE: 111 MMHG | HEART RATE: 82 BPM | SYSTOLIC BLOOD PRESSURE: 146 MMHG

## 2024-03-06 VITALS
DIASTOLIC BLOOD PRESSURE: 87 MMHG | SYSTOLIC BLOOD PRESSURE: 138 MMHG | HEART RATE: 77 BPM | TEMPERATURE: 98 F | RESPIRATION RATE: 15 BRPM | OXYGEN SATURATION: 100 %

## 2024-03-06 DIAGNOSIS — Z98.82 BREAST IMPLANT STATUS: Chronic | ICD-10-CM

## 2024-03-06 LAB
ALBUMIN SERPL ELPH-MCNC: 3.5 G/DL — SIGNIFICANT CHANGE UP (ref 3.3–5)
ALP SERPL-CCNC: 72 U/L — SIGNIFICANT CHANGE UP (ref 40–120)
ALT FLD-CCNC: 14 U/L — SIGNIFICANT CHANGE UP (ref 12–78)
ANION GAP SERPL CALC-SCNC: 6 MMOL/L — SIGNIFICANT CHANGE UP (ref 5–17)
AST SERPL-CCNC: 9 U/L — LOW (ref 15–37)
BASOPHILS # BLD AUTO: 0.02 K/UL — SIGNIFICANT CHANGE UP (ref 0–0.2)
BASOPHILS NFR BLD AUTO: 0.2 % — SIGNIFICANT CHANGE UP (ref 0–2)
BILIRUB SERPL-MCNC: 0.3 MG/DL — SIGNIFICANT CHANGE UP (ref 0.2–1.2)
BUN SERPL-MCNC: 9 MG/DL — SIGNIFICANT CHANGE UP (ref 7–23)
CALCIUM SERPL-MCNC: 8.6 MG/DL — SIGNIFICANT CHANGE UP (ref 8.5–10.1)
CHLORIDE SERPL-SCNC: 108 MMOL/L — SIGNIFICANT CHANGE UP (ref 96–108)
CO2 SERPL-SCNC: 27 MMOL/L — SIGNIFICANT CHANGE UP (ref 22–31)
CREAT SERPL-MCNC: 0.57 MG/DL — SIGNIFICANT CHANGE UP (ref 0.5–1.3)
D DIMER BLD IA.RAPID-MCNC: 183 NG/ML DDU — SIGNIFICANT CHANGE UP
EGFR: 120 ML/MIN/1.73M2 — SIGNIFICANT CHANGE UP
EOSINOPHIL # BLD AUTO: 0.17 K/UL — SIGNIFICANT CHANGE UP (ref 0–0.5)
EOSINOPHIL NFR BLD AUTO: 1.6 % — SIGNIFICANT CHANGE UP (ref 0–6)
GLUCOSE SERPL-MCNC: 88 MG/DL — SIGNIFICANT CHANGE UP (ref 70–99)
HCG SERPL-ACNC: <1 MIU/ML — SIGNIFICANT CHANGE UP
HCT VFR BLD CALC: 40.1 % — SIGNIFICANT CHANGE UP (ref 34.5–45)
HGB BLD-MCNC: 13.5 G/DL — SIGNIFICANT CHANGE UP (ref 11.5–15.5)
IMM GRANULOCYTES NFR BLD AUTO: 0.2 % — SIGNIFICANT CHANGE UP (ref 0–0.9)
LYMPHOCYTES # BLD AUTO: 28.7 % — SIGNIFICANT CHANGE UP (ref 13–44)
LYMPHOCYTES # BLD AUTO: 3.03 K/UL — SIGNIFICANT CHANGE UP (ref 1–3.3)
MAGNESIUM SERPL-MCNC: 1.9 MG/DL — SIGNIFICANT CHANGE UP (ref 1.6–2.6)
MCHC RBC-ENTMCNC: 30.3 PG — SIGNIFICANT CHANGE UP (ref 27–34)
MCHC RBC-ENTMCNC: 33.7 G/DL — SIGNIFICANT CHANGE UP (ref 32–36)
MCV RBC AUTO: 90.1 FL — SIGNIFICANT CHANGE UP (ref 80–100)
MONOCYTES # BLD AUTO: 0.69 K/UL — SIGNIFICANT CHANGE UP (ref 0–0.9)
MONOCYTES NFR BLD AUTO: 6.5 % — SIGNIFICANT CHANGE UP (ref 2–14)
NEUTROPHILS # BLD AUTO: 6.63 K/UL — SIGNIFICANT CHANGE UP (ref 1.8–7.4)
NEUTROPHILS NFR BLD AUTO: 62.8 % — SIGNIFICANT CHANGE UP (ref 43–77)
NRBC # BLD: 0 /100 WBCS — SIGNIFICANT CHANGE UP (ref 0–0)
PLATELET # BLD AUTO: 413 K/UL — HIGH (ref 150–400)
POTASSIUM SERPL-MCNC: 3.2 MMOL/L — LOW (ref 3.5–5.3)
POTASSIUM SERPL-SCNC: 3.2 MMOL/L — LOW (ref 3.5–5.3)
PROT SERPL-MCNC: 7.9 GM/DL — SIGNIFICANT CHANGE UP (ref 6–8.3)
RBC # BLD: 4.45 M/UL — SIGNIFICANT CHANGE UP (ref 3.8–5.2)
RBC # FLD: 12.4 % — SIGNIFICANT CHANGE UP (ref 10.3–14.5)
SODIUM SERPL-SCNC: 141 MMOL/L — SIGNIFICANT CHANGE UP (ref 135–145)
TROPONIN I, HIGH SENSITIVITY RESULT: 3.9 NG/L — SIGNIFICANT CHANGE UP
WBC # BLD: 10.56 K/UL — HIGH (ref 3.8–10.5)
WBC # FLD AUTO: 10.56 K/UL — HIGH (ref 3.8–10.5)

## 2024-03-06 PROCEDURE — 93010 ELECTROCARDIOGRAM REPORT: CPT

## 2024-03-06 PROCEDURE — 71045 X-RAY EXAM CHEST 1 VIEW: CPT | Mod: 26

## 2024-03-06 PROCEDURE — 70450 CT HEAD/BRAIN W/O DYE: CPT | Mod: 26,MC

## 2024-03-06 PROCEDURE — 99285 EMERGENCY DEPT VISIT HI MDM: CPT

## 2024-03-06 RX ORDER — METOCLOPRAMIDE HCL 10 MG
10 TABLET ORAL ONCE
Refills: 0 | Status: COMPLETED | OUTPATIENT
Start: 2024-03-06 | End: 2024-03-06

## 2024-03-06 RX ORDER — KETOROLAC TROMETHAMINE 30 MG/ML
15 SYRINGE (ML) INJECTION ONCE
Refills: 0 | Status: DISCONTINUED | OUTPATIENT
Start: 2024-03-06 | End: 2024-03-06

## 2024-03-06 RX ORDER — POTASSIUM CHLORIDE 20 MEQ
40 PACKET (EA) ORAL ONCE
Refills: 0 | Status: COMPLETED | OUTPATIENT
Start: 2024-03-06 | End: 2024-03-06

## 2024-03-06 RX ORDER — SODIUM CHLORIDE 9 MG/ML
1000 INJECTION INTRAMUSCULAR; INTRAVENOUS; SUBCUTANEOUS ONCE
Refills: 0 | Status: COMPLETED | OUTPATIENT
Start: 2024-03-06 | End: 2024-03-06

## 2024-03-06 RX ADMIN — Medication 1 TABLET(S): at 17:57

## 2024-03-06 RX ADMIN — Medication 15 MILLIGRAM(S): at 17:57

## 2024-03-06 RX ADMIN — Medication 10 MILLIGRAM(S): at 15:46

## 2024-03-06 RX ADMIN — SODIUM CHLORIDE 1000 MILLILITER(S): 9 INJECTION INTRAMUSCULAR; INTRAVENOUS; SUBCUTANEOUS at 15:46

## 2024-03-06 RX ADMIN — Medication 40 MILLIEQUIVALENT(S): at 17:17

## 2024-03-06 NOTE — ED PROVIDER NOTE - CLINICAL SUMMARY MEDICAL DECISION MAKING FREE TEXT BOX
37yo female with pmh asthma presenting with cp, ha.  Had substernal cp intermittently over past month or so.  Some sob, denies now.  No fever, cough, abd pain, vomiting, diarrhea.  + nausea.  Noted bp has also been high.  Noted L sided paresthesias, flushed sensation.  Also endorsing generalized headaches, no sudden onset and intermittent.  No ocps.  Called pmd who was unable to get her in so directed to .  [facility] sent her to ED for further eval.  Well appearing with overall intact neuro exam.  Exam otherwise reassuring.  Lower suspicion acs.  Will get labs, imaging, medicate, reassess.

## 2024-03-06 NOTE — ED PROVIDER NOTE - CARE PROVIDER_API CALL
Mara Lin  Interventional Cardiology  [address] [place] [address]  Phone: [phone]  Fax: [phone]  Follow Up Time:     Leonora Byrnes  Neurology  [address], [address]  [place] [address]  Phone: [phone]  Fax: [phone]  Follow Up Time:

## 2024-03-06 NOTE — ED PROVIDER NOTE - PATIENT PORTAL LINK FT
You can access the FollowMyHealth Patient Portal offered by [facility] by registering at the following website: [email or web address]. By joining [facility]’s FollowMyHealth portal, you will also be able to view your health information using other applications (apps) compatible with our system.

## 2024-03-06 NOTE — ED PROVIDER NOTE - NSFOLLOWUPINSTRUCTIONS_ED_ALL_ED_FT
Rest, drink plenty of fluids  Advance activity as tolerated  Continue all previously prescribed medications as directed  Follow up with your PMD - bring copies of your results  Return to the ER for chest pain, difficulty breathing, worsening symptoms, numbness, weakness, or other new or concerning symptoms   For pain, you may take Tylenol 975mg every six hours and supplement with ibuprofen 600mg, with food, every six hours which can be taken three hours apart from the Tylenol to have a layered effect

## 2024-03-06 NOTE — ED ADULT NURSE NOTE - NSFALLUNIVINTERV_ED_ALL_ED
Bed/Stretcher in lowest position, wheels locked, appropriate side rails in place/Call bell, personal items and telephone in reach/Instruct patient to call for assistance before getting out of bed/chair/stretcher/Non-slip footwear applied when patient is off stretcher/[place] to call system/Physically safe environment - no spills, clutter or unnecessary equipment/Purposeful proactive rounding/Room/bathroom lighting operational, light cord in reach

## 2024-03-06 NOTE — ED ADULT NURSE NOTE - OBJECTIVE STATEMENT
As per pt she noticed increasing headaches with elevated BP beginning in Feb, PMH asthma, went on vacation and returned 2/27. Denies swelling, SOB, but felt CP on Monday, called PMD about elevated BP readings - was told to go to urgent care for EKG. Urgent care told pt to come to ED for further eval, denies fever. States felt tingling sensation on L side of arm down shoulder.

## 2024-03-06 NOTE — ED PROVIDER NOTE - ED STEMI HIDDEN
Impression: Primary open-angle glaucoma, bilateral, indeterminate stage: H40.1134. Plan: Today's IOP : 19/26    Tmax : 19/26 Target IOP low to mid teens. Pachs: 593/567
v. C/D : .5/.75
OCT : 93/87 Plan: 1. Elevated IOP OS>OD. Patient reports using nose spray with steroid, recommended patient to d/c.  Recommend patient to start Latanoprost QHS OU (eRx sent today) hide

## 2024-03-06 NOTE — ED PROVIDER NOTE - OBJECTIVE STATEMENT
39yo female with pmh asthma presenting with cp, ha.  Had substernal cp intermittently over past month or so.  Some sob, denies now.  No fever, cough, abd pain, vomiting, diarrhea.  + nausea.  Noted bp has also been high.  Noted L sided paresthesias, flushed sensation.  Also endorsing generalized headaches, no sudden onset and intermittent.  Called pmd who was unable to get her in so directed to UC. See mdm

## 2024-03-06 NOTE — ED PROVIDER NOTE - PHYSICAL EXAMINATION
General appearance: Nontoxic appearing, conversant, afebrile    Eyes: anicteric sclerae, ROXI, EOMI   HENT: Atraumatic; oropharynx clear, MMM and no ulcerations, no pharyngeal erythema or exudate   Neck: Trachea midline; Full range of motion, supple   Pulm: CTA bl, normal respiratory effort and no intercostal retractions, normal work of breathing   CV: RRR, No murmurs, rubs, or gallops   Abdomen: Soft, non-tender, non-distended; no guarding or rebound   Extremities: No peripheral edema, no gross deformities, FROM x4, 5/5 MS x4, gross sensation intact    Skin: Dry, normal temperature, turgor and texture; no rash   Psych: Appropriate affect, cooperative

## 2024-03-06 NOTE — ED PROVIDER NOTE - PROGRESS NOTE DETAILS
Labs and imaging overall non actionable.  Patient with > 10 days frontal ha and sinus congestion.  This also had worsened from initial episode prior to this.  Will treat for sinusitis.  Reviewed results with patient and answered questions.  Patient feels comfortable with dc and has pmd to follow up with.

## 2024-11-11 NOTE — ED ADULT NURSE NOTE - TEMPLATE LIST FOR HEAD TO TOE ASSESSMENT
Respiratory ED EKG: my independent interpretation - Dr. Ivonne Rivera : [87 NSR, no STEMI]   ED CXR prelim, my independent interpretation - Dr. Ivonne Rivera: [No PTX, No infiltrates, No Free air]

## 2025-04-07 NOTE — ED ADULT TRIAGE NOTE - PATIENT ON (OXYGEN DELIVERY METHOD)
A Healthy Heart: Care Instructions  Overview     Coronary artery disease, also called heart disease, occurs when a substance called plaque builds up in the vessels that supply oxygen-rich blood to your heart muscle. This can narrow the blood vessels and reduce blood flow. A heart attack happens when blood flow is completely blocked. A high-fat diet, smoking, and other factors increase the risk of heart disease.  Your doctor has found that you have a chance of having heart disease. A heart-healthy lifestyle can help keep your heart healthy and prevent heart disease. This lifestyle includes eating healthy, being active, staying at a weight that's healthy for you, and not smoking or using tobacco. It also includes taking medicines as directed, managing other health conditions, and trying to get a healthy amount of sleep.  Follow-up care is a key part of your treatment and safety. Be sure to make and go to all appointments, and call your doctor if you are having problems. It's also a good idea to know your test results and keep a list of the medicines you take.  How can you care for yourself at home?  Diet    Use less salt when you cook and eat. This helps lower your blood pressure. Taste food before salting. Add only a little salt when you think you need it. With time, your taste buds will adjust to less salt.     Eat fewer snack items, fast foods, canned soups, and other high-salt, high-fat, processed foods.     Read food labels and try to avoid saturated and trans fats. They increase your risk of heart disease by raising cholesterol levels.     Limit the amount of solid fat--butter, margarine, and shortening--you eat. Use olive, peanut, or canola oil when you cook. Bake, broil, and steam foods instead of frying them.     Eat a variety of fruit and vegetables every day. Dark green, deep orange, red, or yellow fruits and vegetables are especially good for you. Examples include spinach, carrots, peaches, and 
room air